# Patient Record
Sex: FEMALE | Race: WHITE | HISPANIC OR LATINO | Employment: OTHER | URBAN - METROPOLITAN AREA
[De-identification: names, ages, dates, MRNs, and addresses within clinical notes are randomized per-mention and may not be internally consistent; named-entity substitution may affect disease eponyms.]

---

## 2017-06-16 ENCOUNTER — OFFICE VISIT (OUTPATIENT)
Dept: MEDICAL GROUP | Facility: MEDICAL CENTER | Age: 42
End: 2017-06-16
Payer: OTHER GOVERNMENT

## 2017-06-16 VITALS
OXYGEN SATURATION: 94 % | SYSTOLIC BLOOD PRESSURE: 112 MMHG | HEIGHT: 59 IN | BODY MASS INDEX: 26.81 KG/M2 | TEMPERATURE: 98.2 F | WEIGHT: 133 LBS | DIASTOLIC BLOOD PRESSURE: 72 MMHG | HEART RATE: 64 BPM

## 2017-06-16 DIAGNOSIS — R42 DIZZINESS: ICD-10-CM

## 2017-06-16 DIAGNOSIS — R06.02 SOB (SHORTNESS OF BREATH): ICD-10-CM

## 2017-06-16 DIAGNOSIS — E55.9 VITAMIN D DEFICIENCY: ICD-10-CM

## 2017-06-16 DIAGNOSIS — Z13.29 SCREENING FOR THYROID DISORDER: ICD-10-CM

## 2017-06-16 DIAGNOSIS — R00.2 HEART PALPITATIONS: ICD-10-CM

## 2017-06-16 DIAGNOSIS — F41.9 ANXIETY: ICD-10-CM

## 2017-06-16 DIAGNOSIS — E78.2 MIXED HYPERLIPIDEMIA: ICD-10-CM

## 2017-06-16 DIAGNOSIS — R73.9 HYPERGLYCEMIA: ICD-10-CM

## 2017-06-16 DIAGNOSIS — R10.32 LLQ ABDOMINAL PAIN: ICD-10-CM

## 2017-06-16 PROCEDURE — 99215 OFFICE O/P EST HI 40 MIN: CPT | Performed by: FAMILY MEDICINE

## 2017-06-16 RX ORDER — ALPRAZOLAM 0.25 MG/1
0.25 TABLET ORAL 2 TIMES DAILY PRN
Qty: 60 TAB | Refills: 0 | Status: SHIPPED | OUTPATIENT
Start: 2017-06-16 | End: 2018-01-19 | Stop reason: SDUPTHER

## 2017-06-16 NOTE — MR AVS SNAPSHOT
"Dasha Luu   2017 10:20 AM   Office Visit   MRN: [id number]    Department:  [facility]   Dept Phone:  [phone]    Description:  Female : 1975   Provider:  Anni Munoz M.D.           Reason for Visit     LLQ Pain     Vertigo           Allergies as of 2017     Allergen Noted Reactions    Penicillins 2011   Itching    redness      You were diagnosed with     Dizziness   [482152]       SOB (shortness of breath)   [358537]       Heart palpitations   [707639]       LLQ abdominal pain   [752479]       Mixed hyperlipidemia   [272.2.ICD-9-CM]       Vitamin D deficiency   [5591871]       Hyperglycemia   [464493]       Screening for thyroid disorder   [V77.0.ICD-9-CM]       Anxiety   [144218]         Vital Signs     Blood Pressure Pulse Temperature Height Weight Body Mass Index    112/72 mmHg 64 36.8 °C (98.2 °F) 1.499 m (4' 11.02\") 60.328 kg (133 lb) 26.85 kg/m2    Oxygen Saturation Last Menstrual Period Breastfeeding? Smoking Status          94% 2017 No Former Smoker        Basic Information     Date Of Birth Sex Race Ethnicity Preferred Language    1975 Female  or   Origin (Chinese,Dominican,South African,Jeff, etc) English      Your appointments     2017  8:00 AM   US GYN PELVIS TRANSVAGINAL with [facility] US 1   [facility] IMAGING - ULTRASOUND - [facility]  [address]  [facility] [id number] [phone]            2017  7:20 AM   ANNUAL EXAM PREVENTATIVE with Anni Munoz M.D.   [facility])    [address]  [place] [id number] [phone]              Problem List              ICD-10-CM Priority Class Noted - Resolved    External hemorrhoids K64.4   2014 - Present    Anxiety F41.9   2014 - Present    Hyperglycemia R73.9   Unknown - Present    Thrombocytosis (CMS-HCC) D47.3   Unknown - " Present    History of tobacco abuse Z87.891   6/17/2016 - Present    Thyromegaly E01.0   6/17/2016 - Present    Screening for cervical cancer Z12.4   12/2/2016 - Present    Mixed hyperlipidemia E78.2   12/2/2016 - Present    Vitamin D deficiency E55.9   12/2/2016 - Present    Dizziness R42   6/16/2017 - Present    SOB (shortness of breath) R06.02   6/16/2017 - Present    Heart palpitations R00.2   6/16/2017 - Present    LLQ abdominal pain R10.32   6/16/2017 - Present      Health Maintenance        Date Due Completion Dates    PAP SMEAR 7/26/1998 ---    IMM DTaP/Tdap/Td Vaccine (4 - Td) 11/30/1998 11/30/1988, 10/27/1987, 8/24/1987            Current Immunizations     Dtap Vaccine 11/30/1988, 10/27/1987, 8/24/1987    IPV 11/30/1988, 10/27/1987, 8/24/1987      Below and/or attached are the medications your provider expects you to take. Review all of your home medications and newly ordered medications with your provider and/or pharmacist. Follow medication instructions as directed by your provider and/or pharmacist. Please keep your medication list with you and share with your provider. Update the information when medications are discontinued, doses are changed, or new medications (including over-the-counter products) are added; and carry medication information at all times in the event of emergency situations     Allergies:  PENICILLINS - Itching               Medications  Valid as of: June 16, 2017 -  1:34 PM    Generic Name Brand Name Tablet Size Instructions for use    ALPRAZolam (Tab) XANAX 0.25 MG Take 1 Tab by mouth 2 times a day as needed for Anxiety.        Ibuprofen (Tab) MOTRIN 200 MG Take 200 mg by mouth every 6 hours as needed.        .                 Medicines prescribed today were sent to:     [facility] [address] [place] [address] [place], [place] - [address] [place] [address] AT [facility] & [address] [address]    Phone: [phone] Fax: [phone]    Open 24 Hours?: No      Medication refill  instructions:       If your prescription bottle indicates you have medication refills left, it is not necessary to call your provider’s office. Please contact your pharmacy and they will refill your medication.    If your prescription bottle indicates you do not have any refills left, you may request refills at any time through one of the following ways: The online [employer] system (except Urgent Care), by calling your provider’s office, or by asking your pharmacy to contact your provider’s office with a refill request. Medication refills are processed only during regular business hours and may not be available until the next business day. Your provider may request additional information or to have a follow-up visit with you prior to refilling your medication.   *Please Note: Medication refills are assigned a new Rx number when refilled electronically. Your pharmacy may indicate that no refills were authorized even though a new prescription for the same medication is available at the pharmacy. Please request the medicine by name with the pharmacy before contacting your provider for a refill.        Your To Do List     Future Labs/Procedures Complete By Expires    CBC WITH DIFFERENTIAL  As directed 6/17/2018    COMP METABOLIC PANEL  As directed 6/17/2018    EKG  As directed 6/16/2018    FREE THYROXINE  As directed 6/17/2018    HEMOGLOBIN A1C  As directed 6/17/2018    LIPID PROFILE  As directed 6/17/2018    TSH  As directed 6/17/2018    US-GYN-PELVIS TRANSVAGINAL  As directed 12/17/2017    VITAMIN D,25 HYDROXY  As directed 6/17/2018         Trevena Access Code: Activation code not generated  Current Trevena Status: Active

## 2017-06-16 NOTE — ASSESSMENT & PLAN NOTE
Started a few weeks ago.  Now having episodes where she feels like she is taking deep breaths that last 5-10 minutes.  She has a history of panic attacks. This feels different than those. She denies any depression.

## 2017-06-22 NOTE — ASSESSMENT & PLAN NOTE
Patient has been trying to watch her diet and exercise to control her hyperlipidemia and hyperglycemia. She is due for labs.

## 2017-06-22 NOTE — ASSESSMENT & PLAN NOTE
Patient feels as if her heart starts racing and then skips a beat when she is anxious. This seems to correspond to her feeling of lightheadedness. It doesn't seem to be related to stress or lack of sleep. She has been limiting her caffeine and that doesn't seem to make a difference. She has stopped smoking.

## 2017-06-22 NOTE — PROGRESS NOTES
Subjective:     Chief Complaint   Patient presents with   • LLQ Pain   • Vertigo       Dasha Luu is a 41 y.o. female here today for evaluation and management of:    Dizziness  Started a few weeks ago.  Now having episodes where she feels like she is taking deep breaths that last 5-10 minutes.  She has a history of panic attacks. This feels different than those. She denies any depression.    Anxiety  Patient complains of persistent anxiety.  She often gets very stressed at work or at home. She is using the alprazolam occasionally when he gets very bad. She denies any suicidal thoughts or plans.    Heart palpitations  Patient feels as if her heart starts racing and then skips a beat when she is anxious. This seems to correspond to her feeling of lightheadedness. It doesn't seem to be related to stress or lack of sleep. She has been limiting her caffeine and that doesn't seem to make a difference. She has stopped smoking.    LLQ abdominal pain  Patient is also complaining of occasional sharp pain in the left lower quadrant towards the adnexa. She is unsure if it happens midcycle or not. She does not believe she is ever had an ovarian cyst. She denies any change in her menstrual cycles.    Mixed hyperlipidemia  Patient has been trying to watch her diet and exercise to control her hyperlipidemia and hyperglycemia. She is due for labs.    Vitamin D deficiency  Patient has not been taking her vitamin D supplementation. She denies any recent fractures.       Allergies   Allergen Reactions   • Penicillins Itching     redness       Current medicines (including changes today)  Current Outpatient Prescriptions   Medication Sig Dispense Refill   • alprazolam (XANAX) 0.25 MG Tab Take 1 Tab by mouth 2 times a day as needed for Anxiety. 60 Tab 0   • ibuprofen (MOTRIN) 200 MG Tab Take 200 mg by mouth every 6 hours as needed.       No current facility-administered medications for this visit.       She  has a  "past medical history of Anxiety; Hyperglycemia; and Thrombocytosis (CMS-HCC).    Patient Active Problem List    Diagnosis Date Noted   • Dizziness 06/16/2017   • SOB (shortness of breath) 06/16/2017   • Heart palpitations 06/16/2017   • LLQ abdominal pain 06/16/2017   • Screening for cervical cancer 12/02/2016   • Mixed hyperlipidemia 12/02/2016   • Vitamin D deficiency 12/02/2016   • History of tobacco abuse 06/17/2016   • Thyromegaly 06/17/2016   • Hyperglycemia    • Thrombocytosis (CMS-HCC)    • External hemorrhoids 06/20/2014   • Anxiety 06/20/2014       ROS   No fever or chills.  No nausea or vomiting.  No cough.  No pain with urination or hematuria.  No black or bloody stools.       Objective:     Blood pressure 112/72, pulse 64, temperature 36.8 °C (98.2 °F), height 1.499 m (4' 11.02\"), weight 60.328 kg (133 lb), last menstrual period 06/12/2017, SpO2 94 %, not currently breastfeeding. Body mass index is 26.85 kg/(m^2).   Physical Exam:  Well developed, well nourished.  Alert, oriented in no acute distress.  Eye contact is good, speech goal directed, affect calm  Eyes: conjunctiva non-injected, sclera non-icteric. PERRL. EOMs intact  Ears: Pinna normal. TM pearly gray.   Nose: Nares are patent.  Normal mucosa  Mouth: Oral mucous membranes pink and moist with no lesions. No erythema  Neck Supple.  No adenopathy or masses in the neck or supraclavicular regions. No thyromegaly  Lungs: clear to auscultation bilaterally with good excursion. No wheezes or rhonchi  CV: regular rate and rhythm. No murmur. No carotid bruits   Abdomen: soft, tender to palpation of left lower quadrant, no masses or organomegaly.  No rebound or guarding  Ext: no edema, color normal, vascularity normal, temperature normal  ABD: Abdomen soft, ND, NT. No CVAT. No suprapubic tenderness.  PELVIC:Perineum and external genitalia normal without rash. Vagina with normal and physiologic discharge. Cervix without visible lesions or discharge. " Bimanual exam without adnexal masses or cervical motion tenderness but there is tenderness to palpation of the left adnexa  Neurological: Alert and oriented x 3. DTRs 2+ and symmetric. No cranial nerve deficit. Strength and sensation intact. Negative Rhomberg. No dysdiadochokinesia.   Cranial nerves II through XII are grossly intact.  EKG shows sinus rhythm with a rate of 58. No ST elevations or depressions. Normal axis.          Assessment and Plan:   The following treatment plan was discussed    1. Dizziness  Check for anemia and hyperglycemia. Await results  - CBC WITH DIFFERENTIAL; Future  - COMP METABOLIC PANEL; Future  - EKG; Future    2. SOB (shortness of breath)  Normal EKG. Reassured.  - EKG; Future    3. Heart palpitations  Normal EKG. Patient reassured.  - EKG; Future    4. LLQ abdominal pain  Ultrasound to assess for ovarian cyst  - CBC WITH DIFFERENTIAL; Future  - US-GYN-PELVIS TRANSVAGINAL; Future    5. Mixed hyperlipidemia  Recheck lipids. Discussed treatment options after labs are back  - LIPID PROFILE; Future    6. Vitamin D deficiency  Recheck vitamin D. Adjust supplementation as needed  - VITAMIN D,25 HYDROXY; Future    7. Hyperglycemia  Dietary counseling done. Recheck labs  - COMP METABOLIC PANEL; Future  - HEMOGLOBIN A1C; Future    8. Screening for thyroid disorder  Screening labs ordered.  Await results for counseling.    - TSH; Future  - FREE THYROXINE; Future    9. Anxiety  Refill alprazolam for occasional use. Controlled substance agreement was signed. An [place]  was reviewed.  - CONTROLLED SUBSTANCE TREATMENT AGREEMENT  - alprazolam (XANAX) 0.25 MG Tab; Take 1 Tab by mouth 2 times a day as needed for Anxiety.  Dispense: 60 Tab; Refill: 0    Any change or worsening of signs or symptoms, patient encouraged to follow-up or report to the emergency room for further evaluation. Patient understands and agrees.    Followup: Return in about 3 months (around 9/16/2017).

## 2017-06-22 NOTE — ASSESSMENT & PLAN NOTE
Patient is also complaining of occasional sharp pain in the left lower quadrant towards the adnexa. She is unsure if it happens midcycle or not. She does not believe she is ever had an ovarian cyst. She denies any change in her menstrual cycles.

## 2017-06-22 NOTE — ASSESSMENT & PLAN NOTE
Patient complains of persistent anxiety.  She often gets very stressed at work or at home. She is using the alprazolam occasionally when he gets very bad. She denies any suicidal thoughts or plans.

## 2017-07-01 ENCOUNTER — HOSPITAL ENCOUNTER (OUTPATIENT)
Dept: RADIOLOGY | Facility: MEDICAL CENTER | Age: 42
End: 2017-07-01
Attending: FAMILY MEDICINE
Payer: OTHER GOVERNMENT

## 2017-07-01 DIAGNOSIS — R10.32 LLQ ABDOMINAL PAIN: ICD-10-CM

## 2017-07-01 PROCEDURE — 76830 TRANSVAGINAL US NON-OB: CPT

## 2017-07-07 ENCOUNTER — TELEPHONE (OUTPATIENT)
Dept: MEDICAL GROUP | Facility: MEDICAL CENTER | Age: 42
End: 2017-07-07

## 2017-07-18 ENCOUNTER — OFFICE VISIT (OUTPATIENT)
Dept: MEDICAL GROUP | Facility: MEDICAL CENTER | Age: 42
End: 2017-07-18
Payer: OTHER GOVERNMENT

## 2017-07-18 ENCOUNTER — HOSPITAL ENCOUNTER (OUTPATIENT)
Facility: MEDICAL CENTER | Age: 42
End: 2017-07-18
Attending: FAMILY MEDICINE
Payer: OTHER GOVERNMENT

## 2017-07-18 VITALS
BODY MASS INDEX: 27.42 KG/M2 | HEIGHT: 59 IN | WEIGHT: 136 LBS | HEART RATE: 71 BPM | TEMPERATURE: 98.1 F | OXYGEN SATURATION: 98 % | DIASTOLIC BLOOD PRESSURE: 70 MMHG | SYSTOLIC BLOOD PRESSURE: 110 MMHG

## 2017-07-18 DIAGNOSIS — Z12.4 SCREENING FOR CERVICAL CANCER: ICD-10-CM

## 2017-07-18 DIAGNOSIS — Z01.419 WELL WOMAN EXAM WITH ROUTINE GYNECOLOGICAL EXAM: ICD-10-CM

## 2017-07-18 DIAGNOSIS — Z23 NEED FOR VACCINATION: ICD-10-CM

## 2017-07-18 PROCEDURE — 90715 TDAP VACCINE 7 YRS/> IM: CPT | Performed by: FAMILY MEDICINE

## 2017-07-18 PROCEDURE — 87624 HPV HI-RISK TYP POOLED RSLT: CPT

## 2017-07-18 PROCEDURE — 88175 CYTOPATH C/V AUTO FLUID REDO: CPT

## 2017-07-18 PROCEDURE — 99395 PREV VISIT EST AGE 18-39: CPT | Mod: 25 | Performed by: FAMILY MEDICINE

## 2017-07-18 PROCEDURE — 90471 IMMUNIZATION ADMIN: CPT | Performed by: FAMILY MEDICINE

## 2017-07-18 NOTE — MR AVS SNAPSHOT
"Dasha Luu   2017 7:20 AM   Office Visit   MRN: [id number]    Department:  [facility]   Dept Phone:  [phone]    Description:  Female : 1975   Provider:  Anni Munoz M.D.           Reason for Visit     Gynecologic Exam           Allergies as of 2017     Allergen Noted Reactions    Penicillins 2011   Itching    redness      You were diagnosed with     Well woman exam with routine gynecological exam   [076988]       Screening for cervical cancer   [638270]       Need for vaccination   [946032]         Vital Signs     Blood Pressure Pulse Temperature Height Weight Body Mass Index    110/70 mmHg 71 36.7 °C (98.1 °F) 1.499 m (4' 11.02\") 61.689 kg (136 lb) 27.45 kg/m2    Oxygen Saturation Last Menstrual Period Breastfeeding? Smoking Status          98% 07/10/2017 No Former Smoker        Basic Information     Date Of Birth Sex Race Ethnicity Preferred Language    1975 Female  or   Origin (Sami,Wallisian,Citizen of [place],Jeff, etc) English      Problem List              ICD-10-CM Priority Class Noted - Resolved    External hemorrhoids K64.4   2014 - Present    Anxiety F41.9   2014 - Present    Hyperglycemia R73.9   Unknown - Present    Thrombocytosis (CMS-HCC) D47.3   Unknown - Present    History of tobacco abuse Z87.891   2016 - Present    Thyromegaly E01.0   2016 - Present    Screening for cervical cancer Z12.4   2016 - Present    Mixed hyperlipidemia E78.2   2016 - Present    Vitamin D deficiency E55.9   2016 - Present    Dizziness R42   2017 - Present    SOB (shortness of breath) R06.02   2017 - Present    Heart palpitations R00.2   2017 - Present    LLQ abdominal pain R10.32   2017 - Present    Well woman exam with routine gynecological exam Z01.419   2017 - Present      Health Maintenance        Date Due Completion Dates    PAP SMEAR 1998 ---    IMM INFLUENZA " (1) 9/1/2017 ---    IMM DTaP/Tdap/Td Vaccine (5 - Td) 7/18/2027 7/18/2017, 11/30/1988, 10/27/1987, 8/24/1987            Current Immunizations     Dtap Vaccine 11/30/1988, 10/27/1987, 8/24/1987    IPV 11/30/1988, 10/27/1987, 8/24/1987    Tdap Vaccine 7/18/2017      Below and/or attached are the medications your provider expects you to take. Review all of your home medications and newly ordered medications with your provider and/or pharmacist. Follow medication instructions as directed by your provider and/or pharmacist. Please keep your medication list with you and share with your provider. Update the information when medications are discontinued, doses are changed, or new medications (including over-the-counter products) are added; and carry medication information at all times in the event of emergency situations     Allergies:  PENICILLINS - Itching               Medications  Valid as of: July 18, 2017 -  9:11 AM    Generic Name Brand Name Tablet Size Instructions for use    ALPRAZolam (Tab) XANAX 0.25 MG Take 1 Tab by mouth 2 times a day as needed for Anxiety.        Ibuprofen (Tab) MOTRIN 200 MG Take 200 mg by mouth every 6 hours as needed.        .                 Medicines prescribed today were sent to:     [facility] [address] [place] [address] & [address] [address]    Phone: [phone] Fax: [phone]    Open 24 Hours?: No      Medication refill instructions:       If your prescription bottle indicates you have medication refills left, it is not necessary to call your provider’s office. Please contact your pharmacy and they will refill your medication.    If your prescription bottle indicates you do not have any refills left, you may request refills at any time through one of the following ways: The online [employer] system (except Urgent Care), by calling your provider’s office, or by asking your pharmacy to contact your provider’s office  with a refill request. Medication refills are processed only during regular business hours and may not be available until the next business day. Your provider may request additional information or to have a follow-up visit with you prior to refilling your medication.   *Please Note: Medication refills are assigned a new Rx number when refilled electronically. Your pharmacy may indicate that no refills were authorized even though a new prescription for the same medication is available at the pharmacy. Please request the medicine by name with the pharmacy before contacting your provider for a refill.        Your To Do List     Future Labs/Procedures Complete By Expires    THINPREP PAP WITH HPV  As directed 7/19/2018         [employer] Access Code: Activation code not generated  Current [employer] Status: Active

## 2017-07-18 NOTE — PROGRESS NOTES
SUBJECTIVE:   Chief Complaint   Patient presents with   • Gynecologic Exam       41 y.o. female for annual routine gynecologic exam    Obstetric History       T0      TAB0   SAB0   E0   M0   L1       History   Sexual Activity   • Sexual Activity:   • Partners: Male   • Birth Control/ Protection: Surgical     LMP 7/10/17  Last Pap: 5 years ago  HPV testing unknown  H/O Abnormal Pap unknown  Menses every month with  28 days heavy bleeding.  Cramping is moderate.   She does take OTC analgesics for cramps.  No significant bloating/fluid retention, pelvic pain, or dyspareunia. No vaginal discharge   She does not perform regular self breast exams.  No breast tenderness, mass, nipple discharge, changes in size or contour, or abnormal cyclic discomfort.        ROS:    No urinary tract symptoms, no incontinence.   No abdominal pain, change in bowel habits, black or bloody stools.    No unusual headaches, no visual changes, menstrual migraines   No prolonged cough. No dyspnea or chest pain on exertion.  No depression, labile mood, anxiety ,libido changes, insomnia.  No new/concerning skin lesions,    Exercise: no regular exercise program    Social History   Substance Use Topics   • Smoking status: Former Smoker -- 0.25 packs/day for 16 years     Types: Cigarettes   • Smokeless tobacco: Never Used   • Alcohol Use: No      Comment: 1-2 per week. non none       Patient Active Problem List    Diagnosis Date Noted   • Well woman exam with routine gynecological exam 2017   • Dizziness 2017   • SOB (shortness of breath) 2017   • Heart palpitations 2017   • LLQ abdominal pain 2017   • Screening for cervical cancer 2016   • Mixed hyperlipidemia 2016   • Vitamin D deficiency 2016   • History of tobacco abuse 2016   • Thyromegaly 2016   • Hyperglycemia    • Thrombocytosis (CMS-HCC)    • External hemorrhoids 2014   • Anxiety 2014       Past Medical  "History   Diagnosis Date   • Anxiety    • Hyperglycemia    • Thrombocytosis (CMS-HCC)      Past Surgical History   Procedure Laterality Date   • Abdominoplasty  6/15/2011     Performed by ESVIN BAILEY at [facility]ERY [facility]   • Liposuction  6/15/2011     Performed by ESVIN BAILEY at [facility]   • Mammoplasty augmentation  6/15/2011     Performed by ESVIN BAILEY at [facility]         Family History   Problem Relation Age of Onset   • Cancer Mother      tomor on spinal cord   • Diabetes Mother    • Hypertension Mother    • Alcohol/Drug Father    • Diabetes Sister      gestational dm   • Cancer Maternal Grandmother      breast   • Diabetes Maternal Grandmother    • Heart Disease Maternal Grandmother      Family Status   Relation Status Death Age   • Mother Alive    • Father Alive    • Sister Alive    • Maternal Grandmother       dialysis, breast ca   • Maternal Grandfather     • Paternal Grandmother Alive    • Paternal Grandfather Alive          Current medicines (including changes today)  Current Outpatient Prescriptions   Medication Sig Dispense Refill   • alprazolam (XANAX) 0.25 MG Tab Take 1 Tab by mouth 2 times a day as needed for Anxiety. 60 Tab 0   • ibuprofen (MOTRIN) 200 MG Tab Take 200 mg by mouth every 6 hours as needed.       No current facility-administered medications for this visit.       LMP Date: 07/10/17   Allergies: Penicillins     Preventive Care:  Health Maintenance Topics with due status: Overdue       Topic Date Due    PAP SMEAR 1998    IMM DTaP/Tdap/Td Vaccine 1998       OBJECTIVE:   /70 mmHg  Pulse 71  Temp(Src) 36.7 °C (98.1 °F)  Ht 1.499 m (4' 11.02\")  Wt 61.689 kg (136 lb)  BMI 27.45 kg/m2  SpO2 98%  LMP 07/10/2017  Breastfeeding? No  Body mass index is 27.45 kg/(m^2).      Gen: Well developed, well nourished in no acute distress.   Skin: Pink, warm, and dry. No rashes  Eyes: conjunctiva " non-injected, sclera non-icteric. EOMs intact.   Nasal mucosa without edema nor erythema. No facial tenderness  Ears:Pinna normal. TM pearly gray.   Nose: Nares patent.  No discharge.  Oral mucous membranes pink and moist with no lesions.  Neck: Supple, trachea midline. No adenopathy or masses in the neck or supraclavicular regions. No thyromegaly.  Lungs: Effort is normal. Clear to auscultation bilaterally with good excursion.  CV: regular rate and rhythm. No murmur.  Abdomen: soft, nontender, + BS. No HSM.  No CVAT  Ext: no edema, color normal, vascularity normal, temperature normal  Alert and oriented Eye contact is good, speech goal directed, affect calm     Breast Exam: Performed with instruction during examination. No axillary lymphadenopathy, no skin changes, no dominant masses other than implants in place. No nipple retraction  Pelvic Exam:  Normal external genitalia with no lesions. Normal vaginal mucosa with normal rugation and no discharge. Cervix with multiple cystic structures. No cervical motion tenderness. Uterus is normal sized with no masses. No adnexal tenderness or enlargement appreciated. Pap is obtained and the specimen was sent to lab  Rectal: Good tone, heme negative. No masses.  <ASSESSMENT and PLAN>  1. Well woman exam with routine gynecological exam     2. Screening for cervical cancer  THINPREP PAP WITH HPV   3. Need for vaccination  TDAP VACCINE =>8YO IM       Discussed  mammography screening, exercise, breast exams and osteoporosis prevention     Follow-up in 1 years for next Gyn exam and 3 years for next Pap.   Return in about 1 year (around 7/18/2018).

## 2017-07-19 LAB
CYTOLOGY REG CYTOL: NORMAL
HPV HR 12 DNA CVX QL NAA+PROBE: NEGATIVE
HPV16 DNA SPEC QL NAA+PROBE: NEGATIVE
HPV18 DNA SPEC QL NAA+PROBE: NEGATIVE
SPECIMEN SOURCE: NORMAL

## 2017-08-25 ENCOUNTER — OFFICE VISIT (OUTPATIENT)
Dept: MEDICAL GROUP | Facility: MEDICAL CENTER | Age: 42
End: 2017-08-25
Payer: OTHER GOVERNMENT

## 2017-08-25 VITALS
WEIGHT: 135 LBS | OXYGEN SATURATION: 98 % | DIASTOLIC BLOOD PRESSURE: 76 MMHG | BODY MASS INDEX: 26.5 KG/M2 | SYSTOLIC BLOOD PRESSURE: 112 MMHG | TEMPERATURE: 98.4 F | HEART RATE: 65 BPM | HEIGHT: 60 IN

## 2017-08-25 DIAGNOSIS — F41.9 ANXIETY: ICD-10-CM

## 2017-08-25 DIAGNOSIS — R73.9 HYPERGLYCEMIA: ICD-10-CM

## 2017-08-25 DIAGNOSIS — M54.5 LOW BACK PAIN, UNSPECIFIED BACK PAIN LATERALITY, UNSPECIFIED CHRONICITY, WITH SCIATICA PRESENCE UNSPECIFIED: ICD-10-CM

## 2017-08-25 DIAGNOSIS — E55.9 VITAMIN D DEFICIENCY: ICD-10-CM

## 2017-08-25 PROCEDURE — 99214 OFFICE O/P EST MOD 30 MIN: CPT | Performed by: INTERNAL MEDICINE

## 2017-08-25 NOTE — MR AVS SNAPSHOT
"Dasha Luu   2017 7:40 AM   Office Visit   MRN: [id number]    Department:  [facility]   Dept Phone:  [phone]    Description:  Female : 1975   Provider:  Stefan Cavanaugh M.D.           Allergies as of 2017     Allergen Noted Reactions    Penicillins 2011   Itching    redness      You were diagnosed with     Anxiety   [818764]       Hyperglycemia   [480674]       Vitamin D deficiency   [5839871]       Low back pain, unspecified back pain laterality, unspecified chronicity, with sciatica presence unspecified   [[id number]]         Vital Signs     Blood Pressure Pulse Temperature Height Weight Body Mass Index    112/76 mmHg 65 36.9 °C (98.4 °F) 1.511 m (4' 11.5\") 61.236 kg (135 lb) 26.82 kg/m2    Oxygen Saturation Smoking Status                98% Former Smoker          Basic Information     Date Of Birth Sex Race Ethnicity Preferred Language    1975 Female  or   Origin (French,Jamaican,French,Jeff, etc) English      Problem List              ICD-10-CM Priority Class Noted - Resolved    External hemorrhoids K64.4   2014 - Present    Anxiety F41.9   2014 - Present    Hyperglycemia R73.9   Unknown - Present    Thrombocytosis (CMS-HCC) D47.3   Unknown - Present    History of tobacco abuse Z87.891   2016 - Present    Thyromegaly E01.0   2016 - Present    Screening for cervical cancer Z12.4   2016 - Present    Mixed hyperlipidemia E78.2   2016 - Present    Vitamin D deficiency E55.9   2016 - Present    Dizziness R42   2017 - Present    SOB (shortness of breath) R06.02   2017 - Present    Heart palpitations R00.2   2017 - Present    LLQ abdominal pain R10.32   2017 - Present    Well woman exam with routine gynecological exam Z01.419   2017 - Present      Health Maintenance        Date Due Completion Dates    MAMMOGRAM 2017 ---    IMM INFLUENZA (1) 2017 ---   " PAP SMEAR 7/18/2020 7/18/2017    IMM DTaP/Tdap/Td Vaccine (5 - Td) 7/18/2027 7/18/2017, 11/30/1988, 10/27/1987, 8/24/1987            Current Immunizations     Dtap Vaccine 11/30/1988, 10/27/1987, 8/24/1987    IPV 11/30/1988, 10/27/1987, 8/24/1987    Tdap Vaccine 7/18/2017      Below and/or attached are the medications your provider expects you to take. Review all of your home medications and newly ordered medications with your provider and/or pharmacist. Follow medication instructions as directed by your provider and/or pharmacist. Please keep your medication list with you and share with your provider. Update the information when medications are discontinued, doses are changed, or new medications (including over-the-counter products) are added; and carry medication information at all times in the event of emergency situations     Allergies:  PENICILLINS - Itching               Medications  Valid as of: August 25, 2017 -  8:58 AM    Generic Name Brand Name Tablet Size Instructions for use    ALPRAZolam (Tab) XANAX 0.25 MG Take 1 Tab by mouth 2 times a day as needed for Anxiety.        Ibuprofen (Tab) MOTRIN 200 MG Take 200 mg by mouth every 6 hours as needed.        .                 Medicines prescribed today were sent to:     [facility] [address] [place] [address] & [address] [address]    Phone: [phone] Fax: [phone]    Open 24 Hours?: No      Medication refill instructions:       If your prescription bottle indicates you have medication refills left, it is not necessary to call your provider’s office. Please contact your pharmacy and they will refill your medication.    If your prescription bottle indicates you do not have any refills left, you may request refills at any time through one of the following ways: The online everyArt system (except Urgent Care), by calling your provider’s office, or by asking your pharmacy to contact your  provider’s office with a refill request. Medication refills are processed only during regular business hours and may not be available until the next business day. Your provider may request additional information or to have a follow-up visit with you prior to refilling your medication.   *Please Note: Medication refills are assigned a new Rx number when refilled electronically. Your pharmacy may indicate that no refills were authorized even though a new prescription for the same medication is available at the pharmacy. Please request the medicine by name with the pharmacy before contacting your provider for a refill.           [employer] Access Code: Activation code not generated  Current [employer] Status: Active

## 2017-08-25 NOTE — PROGRESS NOTES
Subjective:      Dasha Luu is a 42 y.o. 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  She has been involved in patient care previously. No difficulties with carrying and lifting.  Did have history of back injury years ago but has seen a chiropractor previously, no current active issues with lifting or carrying things.  The back pain tends to be localized when this occurs, no radiation, no sensory changes lower extremity, no weakness of her legs.  Not taking any regular anti-inflammatories or pain medications.  Patient has been healthy otherwise and takes no regular medications, has had anxiety attacks and has been on xanax as needed basis, anxiety has been stable.  No recent flareups of anxiety.  No depression.  Good social support with family and   No history of TB, no history of infectious disease, no recurrent infections, no cough, shortness of breath, fevers, chills, hemoptysis  Has had negative PPD testing in the past, no BCG  No history infectious or communicable disease  Family history of diabetes mother side patient has had elevated fasting blood sugar, has not had time to exercise because of work obligations  Patient has had elevated fasting blood sugar but no diabetes diagnosis, no hypertension, no dyslipidemia  No history of cardiac disease, no history of asthma  No chest pain, shortness of breath with activity  No tobacco, no etoh  Medications, allergies, medical history, surgical history, social history, family history reviewed and updated  Patient is , 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 60 Tab 0   • ibuprofen (MOTRIN) 200 MG Tab Take 200 mg by mouth every 6 hours as needed.       No current facility-administered medications for this visit.     Patient Active Problem List    Diagnosis Date Noted   • Well woman exam with routine gynecological exam 07/18/2017   • Dizziness 06/16/2017   • SOB (shortness of breath) 06/16/2017   • Heart palpitations 06/16/2017   • LLQ abdominal pain 06/16/2017   • Screening for cervical cancer 12/02/2016   • Mixed hyperlipidemia 12/02/2016   • Vitamin D deficiency 12/02/2016   • History of tobacco abuse 06/17/2016   • Thyromegaly 06/17/2016   • Hyperglycemia    • Thrombocytosis (CMS-HCC)    • External hemorrhoids 06/20/2014   • Anxiety 06/20/2014         ROS       Objective:     /76 mmHg  Pulse 65  Temp(Src) 36.9 °C (98.4 °F)  Ht 1.511 m (4' 11.5\")  Wt 61.236 kg (135 lb)  BMI 26.82 kg/m2  SpO2 98%     Physical Exam   Constitutional: She appears well-developed and well-nourished. No distress.   HENT:   Head: Normocephalic and atraumatic.   Right Ear: External ear normal.   Left Ear: External ear normal.   Mouth/Throat: Oropharynx is clear and moist.   Eyes: Conjunctivae are normal. Right eye exhibits no discharge. Left eye exhibits no discharge. No scleral icterus.   Neck: Neck supple. No JVD present. No thyromegaly present.   Cardiovascular: Normal rate, regular rhythm and normal heart sounds.    No murmur heard.  Pulmonary/Chest: Effort normal and breath sounds normal. No respiratory distress. She has no wheezes.   Abdominal: Soft. She exhibits no distension.   Musculoskeletal: She exhibits no edema.   Neurological: She is alert.   Skin: She is not diaphoretic. No erythema.   Psychiatric: She has a normal mood and affect. Her behavior is normal.   Nursing note and vitals reviewed.    No spinal tenderness, no " CVA tenderness  Lower extremities strength dorsiflexion, plantar flexion, knee flexion extension, hip flexion extension bilateral 5/5  No lower extremity edema  Normal back flexion and extension  Normal gait  Normal affect, insight, judgment     No hallucinations or delusions     Assessment/Plan:     Assessment  #1 evaluation for ability to work in care home environment,has no chronic debilitating underlying medical disease process, and is free of communicable disease with no history of communicable disease such as tuberculosis    #2 impaired fasting blood sugar with family history of diabetes, A1c June 23 of last year 5.9%    #3 history of low back pain sometimes worse with prolonged sitting doing nails at her salon currently stable and controlled without medication    #4 history of panic attacks on alprazolam as needed currently stable no depression currently stable and controlled    #5 low vitamin D labs done in June of last year 28 not taking supplementation      Plan  #1 letter provided indicating that I have evaluated her, she has no underlying chronic medical disease processes that would preclude her from caring for patients, and has no history or evidence of communicable disease at this time, should she require a form to be completed she will drop that off, should a PPD test be required, she can return for that to be administered    #2 patient's primary care provider Dr. Munoz ordered labs for her a few months ago, she has not gotten those done, repeat lab slip order was printed for her to get the laboratory studies done fasting    #3 importance of nutrition, exercise discussed given family history of diabetes and personal history of impaired fasting blood sugar    #4 recommend starting exercise 3 times per week minimum, limit sweets, candies, carbohydrate portion size, no sodas    #5 recommend stretching exercises, chiropractor visits as needed for low back    #6 continue Xanax as needed    #7 annual  influenza vaccine when available    #8 continue no tobacco    #9 follow-up PCP     #10 spent approximately 30 minutes with the patient, greater than 50% of the visit spent discussing lifestyle modification, nutrition, exercise, stretching, relaxation therapy, continue no tobacco, proper lifting for not injuring her back , composing letter and physician's statement indicating fitness for caring for patients    #11 vitamin D 2000 units daily

## 2017-08-25 NOTE — Clinical Note
August 25, 2017        Dear Sir or Madam,      This physician's statement is in regards to Ms. Dasha Luu (1975).  I had the opportunity to evaluate Ms. Luu  today, and find her to be in good overall health.  She does not have any medical conditions or diseases that would preclude her from caring for patients, and she has no communicable diseases.    From my perspective she is medically cleared to care for patients.  Should you have any questions or concerns about this medical evaluation, please feel free to contact my office.            Sincerely,        Stefanwilla Cavanaugh M.D.    Electronically Signed

## 2018-01-09 LAB
25(OH)D3+25(OH)D2 SERPL-MCNC: 22.2 NG/ML (ref 30–100)
ALBUMIN SERPL-MCNC: 4.4 G/DL (ref 3.5–5.5)
ALBUMIN/GLOB SERPL: 1.6 {RATIO} (ref 1.2–2.2)
ALP SERPL-CCNC: 69 IU/L (ref 39–117)
ALT SERPL-CCNC: 17 IU/L (ref 0–32)
AST SERPL-CCNC: 15 IU/L (ref 0–40)
BASOPHILS # BLD AUTO: 0 X10E3/UL (ref 0–0.2)
BASOPHILS NFR BLD AUTO: 0 %
BILIRUB SERPL-MCNC: 0.4 MG/DL (ref 0–1.2)
BUN SERPL-MCNC: 14 MG/DL (ref 6–24)
BUN/CREAT SERPL: 23 (ref 9–23)
CALCIUM SERPL-MCNC: 9.5 MG/DL (ref 8.7–10.2)
CHLORIDE SERPL-SCNC: 102 MMOL/L (ref 96–106)
CHOLEST SERPL-MCNC: 205 MG/DL (ref 100–199)
CO2 SERPL-SCNC: 22 MMOL/L (ref 18–29)
COMMENT 011824: ABNORMAL
CREAT SERPL-MCNC: 0.61 MG/DL (ref 0.57–1)
EOSINOPHIL # BLD AUTO: 0.2 X10E3/UL (ref 0–0.4)
EOSINOPHIL NFR BLD AUTO: 2 %
ERYTHROCYTE [DISTWIDTH] IN BLOOD BY AUTOMATED COUNT: 12.8 % (ref 12.3–15.4)
GLOBULIN SER CALC-MCNC: 2.7 G/DL (ref 1.5–4.5)
GLUCOSE SERPL-MCNC: 98 MG/DL (ref 65–99)
HBA1C MFR BLD: 5.7 % (ref 4.8–5.6)
HCT VFR BLD AUTO: 43.2 % (ref 34–46.6)
HDLC SERPL-MCNC: 82 MG/DL
HGB BLD-MCNC: 14.3 G/DL (ref 11.1–15.9)
IF AFRICAN AMERICAN  100797: 131 ML/MIN/1.73
IF NON AFRICAN AMER 100791: 114 ML/MIN/1.73
IMM GRANULOCYTES # BLD: 0 X10E3/UL (ref 0–0.1)
IMM GRANULOCYTES NFR BLD: 0 %
IMMATURE CELLS  115398: ABNORMAL
LDLC SERPL CALC-MCNC: 111 MG/DL (ref 0–99)
LYMPHOCYTES # BLD AUTO: 2.1 X10E3/UL (ref 0.7–3.1)
LYMPHOCYTES NFR BLD AUTO: 30 %
MCH RBC QN AUTO: 29.1 PG (ref 26.6–33)
MCHC RBC AUTO-ENTMCNC: 33.1 G/DL (ref 31.5–35.7)
MCV RBC AUTO: 88 FL (ref 79–97)
MONOCYTES # BLD AUTO: 0.5 X10E3/UL (ref 0.1–0.9)
MONOCYTES NFR BLD AUTO: 8 %
MORPHOLOGY BLD-IMP: ABNORMAL
NEUTROPHILS # BLD AUTO: 4 X10E3/UL (ref 1.4–7)
NEUTROPHILS NFR BLD AUTO: 60 %
NRBC BLD AUTO-RTO: ABNORMAL %
PLATELET # BLD AUTO: 440 X10E3/UL (ref 150–379)
POTASSIUM SERPL-SCNC: 4.9 MMOL/L (ref 3.5–5.2)
PROT SERPL-MCNC: 7.1 G/DL (ref 6–8.5)
RBC # BLD AUTO: 4.91 X10E6/UL (ref 3.77–5.28)
SODIUM SERPL-SCNC: 141 MMOL/L (ref 134–144)
T4 FREE SERPL-MCNC: 1.21 NG/DL (ref 0.82–1.77)
TRIGL SERPL-MCNC: 60 MG/DL (ref 0–149)
TSH SERPL DL<=0.005 MIU/L-ACNC: 2.12 UIU/ML (ref 0.45–4.5)
VLDLC SERPL CALC-MCNC: 12 MG/DL (ref 5–40)
WBC # BLD AUTO: 6.8 X10E3/UL (ref 3.4–10.8)

## 2018-01-19 ENCOUNTER — OFFICE VISIT (OUTPATIENT)
Dept: MEDICAL GROUP | Facility: MEDICAL CENTER | Age: 43
End: 2018-01-19
Payer: OTHER GOVERNMENT

## 2018-01-19 VITALS
HEIGHT: 60 IN | SYSTOLIC BLOOD PRESSURE: 120 MMHG | DIASTOLIC BLOOD PRESSURE: 76 MMHG | TEMPERATURE: 97.9 F | OXYGEN SATURATION: 100 % | BODY MASS INDEX: 26.9 KG/M2 | WEIGHT: 137 LBS | HEART RATE: 74 BPM | RESPIRATION RATE: 16 BRPM

## 2018-01-19 DIAGNOSIS — E78.5 DYSLIPIDEMIA: ICD-10-CM

## 2018-01-19 DIAGNOSIS — F41.1 GAD (GENERALIZED ANXIETY DISORDER): ICD-10-CM

## 2018-01-19 DIAGNOSIS — R73.9 HYPERGLYCEMIA: ICD-10-CM

## 2018-01-19 DIAGNOSIS — F41.9 ANXIETY: ICD-10-CM

## 2018-01-19 DIAGNOSIS — Z23 NEED FOR VACCINATION: ICD-10-CM

## 2018-01-19 DIAGNOSIS — D75.839 THROMBOCYTOSIS: ICD-10-CM

## 2018-01-19 PROCEDURE — 90471 IMMUNIZATION ADMIN: CPT | Performed by: FAMILY MEDICINE

## 2018-01-19 PROCEDURE — 90686 IIV4 VACC NO PRSV 0.5 ML IM: CPT | Performed by: FAMILY MEDICINE

## 2018-01-19 PROCEDURE — 99214 OFFICE O/P EST MOD 30 MIN: CPT | Mod: 25 | Performed by: FAMILY MEDICINE

## 2018-01-19 RX ORDER — ALPRAZOLAM 0.25 MG/1
0.25 TABLET ORAL 2 TIMES DAILY PRN
Qty: 60 TAB | Refills: 0 | Status: SHIPPED | OUTPATIENT
Start: 2018-01-19 | End: 2018-03-20

## 2018-01-19 ASSESSMENT — PATIENT HEALTH QUESTIONNAIRE - PHQ9
CLINICAL INTERPRETATION OF PHQ2 SCORE: 3
5. POOR APPETITE OR OVEREATING: 3 - NEARLY EVERY DAY
SUM OF ALL RESPONSES TO PHQ QUESTIONS 1-9: 14

## 2018-01-26 NOTE — ASSESSMENT & PLAN NOTE
Patient has elevated lipid levels. She would like to avoid medications if possible. She would like information on low-fat diet.

## 2018-01-26 NOTE — ASSESSMENT & PLAN NOTE
Patient has persistent elevated platelet counts. She denies any history of blood clots. She is concerned about this. She stopped smoking over a year ago.

## 2018-01-26 NOTE — PROGRESS NOTES
Subjective:     Chief Complaint   Patient presents with   • Results     labs   • Orders Needed     mammo   • Follow-Up     throat pain       Dasha Luu is a 42 y.o. female here today for evaluation and management of:    TANVIR (generalized anxiety disorder)  Stable. Currently taking alprazolam as prescribed for occasional use   Denies side effects and is tolerating well.  Mood is improved with current medication and therapy.    Patient denies SI/HI.  Depression Screen (PHQ-2/PHQ-9) 12/2/2016 1/19/2018   PHQ-2 Total Score 1 3   PHQ-9 Total Score - 14           Hyperglycemia  Patient continues to have hyperglycemia. She has not been exercising regularly. She denies any polyuria or polyphagia.    Thrombocytosis  Patient has persistent elevated platelet counts. She denies any history of blood clots. She is concerned about this. She stopped smoking over a year ago.    Dyslipidemia  Patient has elevated lipid levels. She would like to avoid medications if possible. She would like information on low-fat diet.       Allergies   Allergen Reactions   • Penicillins Itching     redness       Current medicines (including changes today)  Current Outpatient Prescriptions   Medication Sig Dispense Refill   • alprazolam (XANAX) 0.25 MG Tab Take 1 Tab by mouth 2 times a day as needed for Anxiety for up to 60 days. 60 Tab 0   • ibuprofen (MOTRIN) 200 MG Tab Take 200 mg by mouth every 6 hours as needed.       No current facility-administered medications for this visit.        She  has a past medical history of Anxiety; Hyperglycemia; and Thrombocytosis (CMS-HCC).    Patient Active Problem List    Diagnosis Date Noted   • Well woman exam with routine gynecological exam 07/18/2017   • Dizziness 06/16/2017   • SOB (shortness of breath) 06/16/2017   • Heart palpitations 06/16/2017   • LLQ abdominal pain 06/16/2017   • Screening for cervical cancer 12/02/2016   • Dyslipidemia 12/02/2016   • Vitamin D deficiency  "12/02/2016   • History of tobacco abuse 06/17/2016   • Thyromegaly 06/17/2016   • Hyperglycemia    • Thrombocytosis (CMS-HCC)    • External hemorrhoids 06/20/2014   • TANVIR (generalized anxiety disorder) 06/20/2014       ROS   No fever or chills.  No nausea or vomiting.  No chest pain or palpitations.  No cough or SOB.  No pain with urination or hematuria.  No black or bloody stools.       Objective:     Blood pressure 120/76, pulse 74, temperature 36.6 °C (97.9 °F), resp. rate 16, height 1.511 m (4' 11.5\"), weight 62.1 kg (137 lb), last menstrual period 01/01/2018, SpO2 100 %. Body mass index is 27.21 kg/m².   Physical Exam:  Well developed, well nourished.  Alert, oriented in no acute distress.  Eye contact is good, speech goal directed, affect calm  Eyes: conjunctiva non-injected, sclera non-icteric.  Ears: Pinna normal. TM pearly gray.   Nose: Nares are patent.  Normal mucosa  Mouth: Oral mucous membranes pink and moist with no lesions  Neck Supple.  No adenopathy or masses in the neck or supraclavicular regions. No thyromegaly  Lungs: clear to auscultation bilaterally with good excursion. No wheezes or rhonchi  CV: regular rate and rhythm. No murmur  Abdomen: soft, nontender, no masses or organomegaly.  No rebound or guarding. No splenomegaly  Ext: no edema, color normal, vascularity normal, temperature normal          Assessment and Plan:   The following treatment plan was discussed    1. Thrombocytosis (CMS-HCC)  Refer to hematology for further evaluation and treatment.  - REFERRAL TO HEMATOLOGY ONCOLOGY Referral to? [facility]    2. Hyperglycemia  Dietary counseling done. Prediabetes information given    3. Dyslipidemia  Low-fat diet information given. Encourage increase exercise    4. TANVIR (generalized anxiety disorder)  Refill alprazolam for occasional use  - alprazolam (XANAX) 0.25 MG Tab; Take 1 Tab by mouth 2 times a day as needed for Anxiety for up to 60 days.  Dispense: 60 Tab; Refill: 0  Controlled " substance agreement on chart    5. Need for vaccination    - INFLUENZA VACCINE QUAD INJ >3Y(PF)    Any change or worsening of signs or symptoms, patient encouraged to follow-up or report to the emergency room for further evaluation. Patient understands and agrees.    Followup: Return in about 6 months (around 7/19/2018).

## 2018-01-26 NOTE — ASSESSMENT & PLAN NOTE
Stable. Currently taking alprazolam as prescribed for occasional use   Denies side effects and is tolerating well.  Mood is improved with current medication and therapy.    Patient denies SI/HI.  Depression Screen (PHQ-2/PHQ-9) 12/2/2016 1/19/2018   PHQ-2 Total Score 1 3   PHQ-9 Total Score - 14

## 2018-01-26 NOTE — ASSESSMENT & PLAN NOTE
Patient continues to have hyperglycemia. She has not been exercising regularly. She denies any polyuria or polyphagia.

## 2018-01-29 ENCOUNTER — OFFICE VISIT (OUTPATIENT)
Dept: HEMATOLOGY ONCOLOGY | Facility: MEDICAL CENTER | Age: 43
End: 2018-01-29
Payer: OTHER GOVERNMENT

## 2018-01-29 ENCOUNTER — HOSPITAL ENCOUNTER (OUTPATIENT)
Facility: MEDICAL CENTER | Age: 43
End: 2018-01-29
Attending: INTERNAL MEDICINE
Payer: OTHER GOVERNMENT

## 2018-01-29 VITALS
DIASTOLIC BLOOD PRESSURE: 90 MMHG | BODY MASS INDEX: 28.13 KG/M2 | HEIGHT: 60 IN | RESPIRATION RATE: 16 BRPM | HEART RATE: 83 BPM | SYSTOLIC BLOOD PRESSURE: 120 MMHG | TEMPERATURE: 98.8 F | OXYGEN SATURATION: 99 % | WEIGHT: 143.3 LBS

## 2018-01-29 VITALS
OXYGEN SATURATION: 99 % | HEIGHT: 60 IN | BODY MASS INDEX: 28.13 KG/M2 | DIASTOLIC BLOOD PRESSURE: 90 MMHG | TEMPERATURE: 98.8 F | WEIGHT: 143.3 LBS | RESPIRATION RATE: 16 BRPM | HEART RATE: 83 BPM | SYSTOLIC BLOOD PRESSURE: 120 MMHG

## 2018-01-29 DIAGNOSIS — D75.839 THROMBOCYTOSIS: ICD-10-CM

## 2018-01-29 PROCEDURE — 36415 COLL VENOUS BLD VENIPUNCTURE: CPT | Performed by: INTERNAL MEDICINE

## 2018-01-29 PROCEDURE — 99203 OFFICE O/P NEW LOW 30 MIN: CPT | Performed by: INTERNAL MEDICINE

## 2018-01-29 PROCEDURE — 81270 JAK2 GENE: CPT

## 2018-01-29 ASSESSMENT — PAIN SCALES - GENERAL
PAINLEVEL: NO PAIN
PAINLEVEL: NO PAIN

## 2018-01-29 NOTE — PROGRESS NOTES
Dasha Luu is a 42 y.o. female here for a non-provider visit for: Lab Draws  on 1/29/2018 at 9:57 AM    Procedure Performed: Venipuncture     Anatomical site: Right Antecubital Area (AC)    Equipment used: 21g    Labs drawn: Miscellaneous lab test    Ordering Provider: Dr. VARINDER Wright By: Yolanda Rodriguez, Med Ass't  No Complications

## 2018-01-29 NOTE — PROGRESS NOTES
Consult Note: Hematology    Date of consultation: 1/29/2018     Referring provider: Anni Munoz M.D.    Reason for consultation:   CC:thrombocytosis    History of presenting illness:   Dasha Luu  is a 42 y.o. year old female first seen in clinic on 1/29/2018 for evaluation of thrombocytosis      Thrombocytosis  Location, blood  Severity, mild  Timing, constant  Modifying factors, no  Quality, platelet  Duration, 1st noticed June 2014   Context, discovered incidentally on routine blood work  Associated factors, not associated with any abnormal bleeding bruising or clotting    Past Medical History:    Past Medical History:   Diagnosis Date   • Anxiety    • Hyperglycemia    • Thrombocytosis (CMS-HCC)        Past surgical history:    Past Surgical History:   Procedure Laterality Date   • ABDOMINOPLASTY  6/15/2011    Performed by ESVIN BAILEY at SUR[facility]ERY [facility] ORS   • LIPOSUCTION  6/15/2011    Performed by ESVIN BAILEY at [facility] ORS   • MAMMOPLASTY AUGMENTATION  6/15/2011    Performed by ESVIN BAILEY at [facility] ORS       Allergies:  Penicillins    Medications:    Current Outpatient Prescriptions   Medication Sig Dispense Refill   • Cholecalciferol (VITAMIN D PO) Take  by mouth.     • multivitamin (THERAGRAN) Tab Take 1 Tab by mouth every day.     • alprazolam (XANAX) 0.25 MG Tab Take 1 Tab by mouth 2 times a day as needed for Anxiety for up to 60 days. 60 Tab 0   • ibuprofen (MOTRIN) 200 MG Tab Take 200 mg by mouth every 6 hours as needed.       No current facility-administered medications for this visit.        Social History:     Social History     Social History   • Marital status:      Spouse name: N/A   • Number of children: N/A   • Years of education: N/A     Occupational History   • Manicurist Self Employed     Social History Main Topics   • Smoking status: Former Smoker     Packs/day: 0.25     Years: 16.00     Types:  "Cigarettes   • Smokeless tobacco: Never Used   • Alcohol use No      Comment: 1-2 per week. non none   • Drug use: No   • Sexual activity: Yes     Partners: Male     Birth control/ protection: Surgical     Other Topics Concern   • Not on file     Social History Narrative   • No narrative on file       Family History:     Family History   Problem Relation Age of Onset   • Cancer Mother      tomor on spinal cord   • Diabetes Mother    • Hypertension Mother    • Alcohol/Drug Father    • Diabetes Sister      gestational dm   • Cancer Maternal Grandmother      breast   • Diabetes Maternal Grandmother    • Heart Disease Maternal Grandmother        Review of Systems:  Constitutional: No fever, chills, weight loss ,malaise/fatigue.      All other review of systems are negative except what was mentioned above in the HPI.    Physical Exam:  Vitals:   /90   Pulse 83   Temp 37.1 °C (98.8 °F)   Resp 16   Ht 1.511 m (4' 11.5\")   Wt 65 kg (143 lb 4.8 oz)   LMP 01/01/2018   SpO2 99%   Breastfeeding? No   BMI 28.46 kg/m²     General: Not in acute distress, alert and oriented x 3  HEENT: No pallor, icterus. Oropharynx clear.   Neck: Supple, no palpable masses.  Lymph nodes: No palpable cervical, supraclavicular, axillary or inguinal lymphadenopathy.    CVS: regular rate and rhythm, no rubs or gallops  RESP: Clear to auscultate bilaterally, no wheezing or crackles.   ABD: Soft, non tender, non distended, positive bowel sounds, no palpable organomegaly  EXT: No edema or cyanosis  CNS: Alert and oriented x3, No focal deficits.  Skin- No rash      Labs:   Results for BOB BLU RAMIRES (MRN [id number]) as of 1/29/2018 16:01   6/13/2011 09:07 6/16/2014 07:31 6/23/2016 07:09 1/6/2018 10:00   WBC 6.6 6.7 6.6 6.8   RBC 4.67 4.98 4.94 4.91   Hemoglobin 14.5 14.5 14.5 14.3   Hematocrit 41.9 45.1 45.6 43.2   MCV 89.9 91 92 88   MCH 31.1 29.1 29.4 29.1   MCHC 34.7 32.2 31.8 33.1   RDW 12.4 12.7 13.2 12.8   Platelet " Count 378 390 (H) 425 (H) 440 (H)     Imaging:    Assessment and Plan:  -Thrombocytosis  -We'll check a JAK2 mutation  -Patient will start aspirin 81 mg daily  -Patient has low risk of thrombosis  -Cytoreductive therapy not indicated at this time  -No indication for bone marrow biopsy at this time      She agreed and verbalized her agreement and understanding with the current plan.  I answered all questions and concerns she has at this time.  Dear  Anni Munoz M.D.,  Thank you for allowing me to participate in her care.    All labs and/or imaging studies mentioned in the note above were reviewed with and explained to the patient as a pertain to medical decision making.    Please note that this dictation was created using voice recognition software. I have made every reasonable attempt to correct obvious errors, but I expect that there are errors of grammar and possibly content that I did not discover before finalizing the note.      SIGNATURES:  Lion Aguilera    CC:  JAZMIN Fraga Emily A, M.D.

## 2018-02-02 LAB — TEST NAME 95000: NORMAL

## 2018-02-13 ENCOUNTER — TELEPHONE (OUTPATIENT)
Dept: HEMATOLOGY ONCOLOGY | Facility: MEDICAL CENTER | Age: 43
End: 2018-02-13

## 2018-02-26 ENCOUNTER — TELEPHONE (OUTPATIENT)
Dept: HEMATOLOGY ONCOLOGY | Facility: MEDICAL CENTER | Age: 43
End: 2018-02-26

## 2018-02-27 NOTE — TELEPHONE ENCOUNTER
I called patient back to inform her that the labs she thought  need to reorder and fax to [facility] were already done and that no other labs are necessary at this time. Patient didn't answer so I left a voicemail.

## 2018-03-15 ENCOUNTER — HOSPITAL ENCOUNTER (OUTPATIENT)
Facility: MEDICAL CENTER | Age: 43
End: 2018-03-15
Attending: INTERNAL MEDICINE
Payer: OTHER GOVERNMENT

## 2018-03-15 ENCOUNTER — NON-PROVIDER VISIT (OUTPATIENT)
Dept: HEMATOLOGY ONCOLOGY | Facility: MEDICAL CENTER | Age: 43
End: 2018-03-15
Payer: OTHER GOVERNMENT

## 2018-03-15 VITALS
OXYGEN SATURATION: 100 % | HEIGHT: 60 IN | BODY MASS INDEX: 28.18 KG/M2 | HEART RATE: 75 BPM | DIASTOLIC BLOOD PRESSURE: 86 MMHG | TEMPERATURE: 98.8 F | WEIGHT: 143.52 LBS | RESPIRATION RATE: 16 BRPM | SYSTOLIC BLOOD PRESSURE: 130 MMHG

## 2018-03-15 DIAGNOSIS — D75.839 THROMBOCYTOSIS: ICD-10-CM

## 2018-03-15 LAB
ERYTHROCYTE [DISTWIDTH] IN BLOOD BY AUTOMATED COUNT: 39.1 FL (ref 35.9–50)
HCT VFR BLD AUTO: 43.3 % (ref 37–47)
HGB BLD-MCNC: 14.2 G/DL (ref 12–16)
MCH RBC QN AUTO: 28.8 PG (ref 27–33)
MCHC RBC AUTO-ENTMCNC: 32.8 G/DL (ref 33.6–35)
MCV RBC AUTO: 87.8 FL (ref 81.4–97.8)
PLATELET # BLD AUTO: 433 K/UL (ref 164–446)
PMV BLD AUTO: 9.4 FL (ref 9–12.9)
RBC # BLD AUTO: 4.93 M/UL (ref 4.2–5.4)
WBC # BLD AUTO: 7.3 K/UL (ref 4.8–10.8)

## 2018-03-15 PROCEDURE — 99213 OFFICE O/P EST LOW 20 MIN: CPT | Performed by: INTERNAL MEDICINE

## 2018-03-15 PROCEDURE — 81402 MOPATH PROCEDURE LEVEL 3: CPT

## 2018-03-15 PROCEDURE — 85027 COMPLETE CBC AUTOMATED: CPT

## 2018-03-15 PROCEDURE — 36415 COLL VENOUS BLD VENIPUNCTURE: CPT | Performed by: INTERNAL MEDICINE

## 2018-03-15 ASSESSMENT — PAIN SCALES - GENERAL: PAINLEVEL: NO PAIN

## 2018-03-15 NOTE — PROGRESS NOTES
Consult Note: Hematology    Date of consultation: 3/15/2018     Referring provider: Anni Munoz M.D.    Reason for consultation:   CC:thrombocytosis    History of presenting illness:   Dasha Luu  is a 42 y.o. year old female first seen in clinic on 1/29/2018 for evaluation of thrombocytosis. She is accompanied by her  who provided some of the history. She denies any new or acute complaints.      Thrombocytosis  Location, blood  Severity, mild  Timing, constant  Modifying factors, no  Quality, platelet  Duration, 1st noticed June 2014   Context, discovered incidentally on routine blood work  Associated factors, not associated with any abnormal bleeding bruising or clotting    Past Medical History:    Past Medical History:   Diagnosis Date   • Anxiety    • Hyperglycemia    • Thrombocytosis (CMS-HCC)        Past surgical history:    Past Surgical History:   Procedure Laterality Date   • ABDOMINOPLASTY  6/15/2011    Performed by ESVIN BAILEY at SURGERY [facility]S   • LIPOSUCTION  6/15/2011    Performed by ESVIN BAILEY at [facility] ORS   • MAMMOPLASTY AUGMENTATION  6/15/2011    Performed by ESVIN BAILEY at [facility] ORS       Allergies:  Penicillins    Medications:    Current Outpatient Prescriptions   Medication Sig Dispense Refill   • Cholecalciferol (VITAMIN D PO) Take  by mouth.     • multivitamin (THERAGRAN) Tab Take 1 Tab by mouth every day.     • alprazolam (XANAX) 0.25 MG Tab Take 1 Tab by mouth 2 times a day as needed for Anxiety for up to 60 days. 60 Tab 0   • ibuprofen (MOTRIN) 200 MG Tab Take 200 mg by mouth every 6 hours as needed.       No current facility-administered medications for this visit.        Social History:     Social History     Social History   • Marital status:      Spouse name: N/A   • Number of children: N/A   • Years of education: N/A     Occupational History   • Manicurist Self Employed  "    Social History Main Topics   • Smoking status: Former Smoker     Packs/day: 0.25     Years: 16.00     Types: Cigarettes   • Smokeless tobacco: Never Used   • Alcohol use No      Comment: 1-2 per week. non none   • Drug use: No   • Sexual activity: Yes     Partners: Male     Birth control/ protection: Surgical     Other Topics Concern   • Not on file     Social History Narrative   • No narrative on file       Family History:     Family History   Problem Relation Age of Onset   • Cancer Mother      tomor on spinal cord   • Diabetes Mother    • Hypertension Mother    • Alcohol/Drug Father    • Diabetes Sister      gestational dm   • Cancer Maternal Grandmother      breast   • Diabetes Maternal Grandmother    • Heart Disease Maternal Grandmother        Review of Systems:  Constitutional: No fever, chills, weight loss ,malaise/fatigue.      All other review of systems are negative except what was mentioned above in the HPI.    Physical Exam:  Vitals:   /86   Pulse 75   Temp 37.1 °C (98.8 °F)   Resp 16   Ht 1.511 m (4' 11.5\")   Wt 65.1 kg (143 lb 8.3 oz)   SpO2 100%   BMI 28.50 kg/m²     General: Not in acute distress, alert and oriented x 3  HEENT: No pallor, icterus. Oropharynx clear.   Neck: Supple, no palpable masses.  Lymph nodes: No palpable cervical, supraclavicular, axillary or inguinal lymphadenopathy.    CVS: regular rate and rhythm, no rubs or gallops  RESP: Clear to auscultate bilaterally, no wheezing or crackles.   ABD: Soft, non tender, non distended, positive bowel sounds, no palpable organomegaly  EXT: No edema or cyanosis  CNS: Alert and oriented x3, No focal deficits.  Skin- No rash      Labs:   1/29/18  JAK2 Gene, V617F Mutation, Qualitative   Not Detected   There is no evidence of the JAK2 V617F point mutation by PCR   analysis. This result does not entirely exclude the possibility of   a JAK2 V617F mutation below the test limit of detection.   CALR testing will be performed.   This " result has been reviewed and approved by Hussein Irizarry M.D.,   Ph.D.     Imaging:    Assessment and Plan:  -Thrombocytosis  -Established stable  -Neg for JAK2 mutation Jan 29, 2018  -Continue aspirin 81 mg daily  -Patient has low risk of thrombosis  -Cytoreductive therapy not indicated at this time  -Discussed option for bone marrow biopsy at this time, she would like to defer at this time. Based on her age and risk it would not .   -Check for CALR,MPL mutation  -check us abd    She agreed and verbalized her agreement and understanding with the current plan.  I answered all questions and concerns she has at this time.  Dear  Anni Munoz M.D.,  Thank you for allowing me to participate in her care.    All labs and/or imaging studies mentioned in the note above were reviewed with and explained to the patient as a pertain to medical decision making.    Please note that this dictation was created using voice recognition software. I have made every reasonable attempt to correct obvious errors, but I expect that there are errors of grammar and possibly content that I did not discover before finalizing the note.      SIGNATURES:  Lion Aguilera    CC:  Anni Munoz M.D.  Anni Munoz M.D.

## 2018-03-21 LAB — TEST NAME 95000: NORMAL

## 2018-03-22 VITALS
DIASTOLIC BLOOD PRESSURE: 86 MMHG | HEIGHT: 60 IN | SYSTOLIC BLOOD PRESSURE: 130 MMHG | RESPIRATION RATE: 16 BRPM | HEART RATE: 75 BPM | TEMPERATURE: 98.8 F | BODY MASS INDEX: 28.07 KG/M2 | OXYGEN SATURATION: 100 % | WEIGHT: 143 LBS

## 2018-03-22 ASSESSMENT — PAIN SCALES - GENERAL: PAINLEVEL: NO PAIN

## 2018-03-22 NOTE — PROGRESS NOTES
Dasha Luu is a 42 y.o. female here for a non-provider visit for: Lab Draws  on 3/22/2018 at 10:55 AM    Procedure Performed: Venipuncture     Anatomical site: Right Antecubital Area (AC)    Equipment used: 21g    Labs drawn: CBC w/diff and Misc    Ordering Provider: Dr. VARINDER Aguilera    Kyle By: Yolanda Rodriguez, Vitaliy Ass't  No complications and Dr. Aguilera was present in the office the entire time that I was doing the patients blood draw.

## 2018-04-08 ENCOUNTER — HOSPITAL ENCOUNTER (OUTPATIENT)
Dept: RADIOLOGY | Facility: MEDICAL CENTER | Age: 43
End: 2018-04-08
Attending: INTERNAL MEDICINE
Payer: OTHER GOVERNMENT

## 2018-04-08 DIAGNOSIS — D75.839 THROMBOCYTOSIS: ICD-10-CM

## 2018-04-08 PROCEDURE — 76700 US EXAM ABDOM COMPLETE: CPT

## 2018-06-15 ENCOUNTER — OFFICE VISIT (OUTPATIENT)
Dept: URGENT CARE | Facility: CLINIC | Age: 43
End: 2018-06-15
Payer: OTHER GOVERNMENT

## 2018-06-15 VITALS
TEMPERATURE: 98.1 F | SYSTOLIC BLOOD PRESSURE: 114 MMHG | OXYGEN SATURATION: 98 % | WEIGHT: 138 LBS | HEART RATE: 79 BPM | BODY MASS INDEX: 27.09 KG/M2 | HEIGHT: 60 IN | RESPIRATION RATE: 14 BRPM | DIASTOLIC BLOOD PRESSURE: 84 MMHG

## 2018-06-15 DIAGNOSIS — J40 LARYNGOTRACHEOBRONCHITIS: ICD-10-CM

## 2018-06-15 DIAGNOSIS — J04.0 LARYNGITIS: ICD-10-CM

## 2018-06-15 DIAGNOSIS — J02.9 PHARYNGITIS, UNSPECIFIED ETIOLOGY: ICD-10-CM

## 2018-06-15 DIAGNOSIS — R05.9 COUGH: ICD-10-CM

## 2018-06-15 LAB
INT CON NEG: NEGATIVE
INT CON POS: POSITIVE
S PYO AG THROAT QL: NORMAL

## 2018-06-15 PROCEDURE — 87880 STREP A ASSAY W/OPTIC: CPT | Performed by: PHYSICIAN ASSISTANT

## 2018-06-15 PROCEDURE — 99203 OFFICE O/P NEW LOW 30 MIN: CPT | Performed by: PHYSICIAN ASSISTANT

## 2018-06-15 RX ORDER — ALPRAZOLAM 0.25 MG/1
0.25 TABLET ORAL NIGHTLY PRN
COMMUNITY
End: 2018-09-11 | Stop reason: SDUPTHER

## 2018-06-15 RX ORDER — DOXYCYCLINE HYCLATE 100 MG
100 TABLET ORAL 2 TIMES DAILY
Qty: 14 TAB | Refills: 0 | Status: SHIPPED | OUTPATIENT
Start: 2018-06-15 | End: 2018-06-22

## 2018-06-15 RX ORDER — BENZONATATE 100 MG/1
200 CAPSULE ORAL 3 TIMES DAILY PRN
Qty: 60 CAP | Refills: 0 | Status: SHIPPED | OUTPATIENT
Start: 2018-06-15 | End: 2018-06-20

## 2018-06-15 RX ORDER — ASPIRIN 81 MG/1
81 TABLET, CHEWABLE ORAL DAILY
COMMUNITY

## 2018-06-15 RX ORDER — METHYLPREDNISOLONE 4 MG/1
TABLET ORAL
Qty: 21 TAB | Refills: 0 | Status: SHIPPED | OUTPATIENT
Start: 2018-06-15 | End: 2018-11-01

## 2018-06-15 ASSESSMENT — ENCOUNTER SYMPTOMS
SPUTUM PRODUCTION: 0
HEMOPTYSIS: 0
SHORTNESS OF BREATH: 0
SORE THROAT: 1
WHEEZING: 1
COUGH: 1
FEVER: 0
CHILLS: 0

## 2018-06-15 NOTE — PATIENT INSTRUCTIONS
Pharyngitis  Pharyngitis is a sore throat (pharynx). There is redness, pain, and swelling of your throat.  Follow these instructions at home:  · Drink enough fluids to keep your pee (urine) clear or pale yellow.  · Only take medicine as told by your doctor.  ¨ You may get sick again if you do not take medicine as told. Finish your medicines, even if you start to feel better.  ¨ Do not take aspirin.  · Rest.  · Rinse your mouth (gargle) with salt water (½ tsp of salt per 1 qt of water) every 1-2 hours. This will help the pain.  · If you are not at risk for choking, you can suck on hard candy or sore throat lozenges.  Contact a doctor if:  · You have large, tender lumps on your neck.  · You have a rash.  · You cough up green, yellow-brown, or bloody spit.  Get help right away if:  · You have a stiff neck.  · You drool or cannot swallow liquids.  · You throw up (vomit) or are not able to keep medicine or liquids down.  · You have very bad pain that does not go away with medicine.  · You have problems breathing (not from a stuffy nose).  This information is not intended to replace advice given to you by your health care provider. Make sure you discuss any questions you have with your health care provider.  Document Released: 06/05/2009 Document Revised: 05/25/2017 Document Reviewed: 08/25/2014  [employer] Patient Education © 2017 [employer]

## 2018-06-15 NOTE — PROGRESS NOTES
"Subjective:   Dasha Luu is a 42 y.o. female who presents for Pharyngitis      Pt reports onset of sore throat, hoarse voice and cough 5 days ago. Cough is nonproductive, worse at night, keeping awake at night. No fever. No known exposure to strep or croup. Pt reports cough is severe to point that it is \"scary\". Daughter has been ill with similar. No prior hx of asthma.          Pharyngitis    This is a new problem. The current episode started in the past 7 days. The problem has been unchanged. There has been no fever. Associated symptoms include coughing. Pertinent negatives include no congestion, ear pain or shortness of breath.     Review of Systems   Constitutional: Negative for chills, fever and malaise/fatigue.   HENT: Positive for sore throat. Negative for congestion and ear pain.    Respiratory: Positive for cough and wheezing. Negative for hemoptysis, sputum production and shortness of breath.    Cardiovascular: Negative for chest pain.   Skin: Negative for rash.   All other systems reviewed and are negative.    Allergies   Allergen Reactions   • Penicillins Itching     redness        Objective:   /84   Pulse 79   Temp 36.7 °C (98.1 °F)   Resp 14   Ht 1.511 m (4' 11.5\")   Wt 62.6 kg (138 lb)   SpO2 98%   BMI 27.41 kg/m²   Physical Exam   Constitutional: She is oriented to person, place, and time. She appears well-developed and well-nourished.   Hoarse voice.   HENT:   Head: Normocephalic and atraumatic.   Right Ear: External ear normal.   Left Ear: External ear normal.   Nose: Nose normal.   Mouth/Throat: Posterior oropharyngeal edema and posterior oropharyngeal erythema present. No oropharyngeal exudate.   Eyes: Conjunctivae and EOM are normal. Pupils are equal, round, and reactive to light.   Neck: Normal range of motion. Neck supple.   Cardiovascular: Normal rate, regular rhythm and normal heart sounds.  Exam reveals no gallop and no friction rub.    No murmur " heard.  Pulmonary/Chest: Effort normal. No respiratory distress. She has no wheezes. She has no rales.   Coarse breath sounds with upper airway rhonchi.   Abdominal: Soft. Bowel sounds are normal. She exhibits no distension and no mass. There is no tenderness. There is no rebound and no guarding.   Musculoskeletal: Normal range of motion. She exhibits no edema, tenderness or deformity.   Lymphadenopathy:     She has no cervical adenopathy.   Neurological: She is alert and oriented to person, place, and time.   Skin: Skin is warm and dry. No rash noted.   Psychiatric: She has a normal mood and affect. Judgment normal.          Assessment/Plan:   Assessment    1. Laryngotracheobronchitis  - doxycycline (VIBRAMYCIN) 100 MG Tab; Take 1 Tab by mouth 2 times a day for 7 days.  Dispense: 14 Tab; Refill: 0  - MethylPREDNISolone (MEDROL DOSEPAK) 4 MG Tablet Therapy Pack; Use as directed  Dispense: 21 Tab; Refill: 0  - benzonatate (TESSALON) 100 MG Cap; Take 2 Caps by mouth 3 times a day as needed for Cough for up to 5 days.  Dispense: 60 Cap; Refill: 0    Patient will be treated with doxycycline and a Medrol Dosepak. She is given Tessalon Perles as needed for cough. May want to try adding Mucinex to her regimen.      2. Cough  - benzonatate (TESSALON) 100 MG Cap; Take 2 Caps by mouth 3 times a day as needed for Cough for up to 5 days.  Dispense: 60 Cap; Refill: 0 see #1 above.    3. Laryngitis  See #1 above. Recommend seeking and has normal tone as possible.    4. Pharyngitis, unspecified etiology  - POCT Rapid Strep A colon negative     Differential diagnosis, natural history, supportive care, and indications for immediate follow-up discussed.      If not improving in 3-5 days, F/U with PCP or return to  or sooner if worsens  Strict ER precautions given.    Please note that this note was created using voice recognition speech to text software. Every effort has been made to correct obvious errors.  However, I expect there  are errors of grammar and possibly context that were not discovered prior to finalizing the note

## 2018-06-22 ENCOUNTER — TELEPHONE (OUTPATIENT)
Dept: URGENT CARE | Facility: CLINIC | Age: 43
End: 2018-06-22

## 2018-06-22 NOTE — TELEPHONE ENCOUNTER
Received a phone message that the patient had contacted urgent care stating that she was still experiencing symptoms and wanted another round of antibiotics and steroids. I did attempt to reach the patient to discuss and ask for some questions. However she was not available in a voicemail was left for her to call me if the [place] clinic. I did inform her on her voicemail that if she is worse in any way I'm going to recommend that she be reevaluated and that I likely would not be repeating the steroids.

## 2018-06-30 ENCOUNTER — OFFICE VISIT (OUTPATIENT)
Dept: URGENT CARE | Facility: CLINIC | Age: 43
End: 2018-06-30
Payer: OTHER GOVERNMENT

## 2018-06-30 VITALS
BODY MASS INDEX: 27.29 KG/M2 | WEIGHT: 139 LBS | HEIGHT: 60 IN | RESPIRATION RATE: 16 BRPM | SYSTOLIC BLOOD PRESSURE: 110 MMHG | DIASTOLIC BLOOD PRESSURE: 80 MMHG | HEART RATE: 77 BPM | TEMPERATURE: 97.4 F | OXYGEN SATURATION: 100 %

## 2018-06-30 DIAGNOSIS — R07.0 THROAT PAIN IN ADULT: ICD-10-CM

## 2018-06-30 DIAGNOSIS — R09.82 POST-NASAL DRIP: ICD-10-CM

## 2018-06-30 PROCEDURE — 99214 OFFICE O/P EST MOD 30 MIN: CPT | Performed by: FAMILY MEDICINE

## 2018-06-30 NOTE — PROGRESS NOTES
HPI: Dasha Luu is a 42 y.o. female who presents with   Chief Complaint   Patient presents with   • Pharyngitis     completed medication, all symptoms have return, not as bad, woke last night feeling not able to breath      Patient presents to urgent care with acute onset of new symptoms cough at night feeling of choking sore throat denies fevers or chills no nausea vomiting or diarrhea she was seen earlier in the month of prescribed medications for similar types of symptoms that did resolve.    She has never suffered from seasonal allergies before. Denies any chest pain or feeling lightheaded or faint.  Worsened by: activity, laying supine at night, first thing in the morning, when exposed to outside allergens  Improved by: OTC symptomatic medictions    Ros Review of Systems performed. All other systems are negative except for what is listed above.     PMH:  has a past medical history of Anxiety; Hyperglycemia; and Thrombocytosis (HCC).  MEDS:   Current Outpatient Prescriptions:   •  mag hydrox-al hydrox-simeth-diphenhydrAMINE-lidocaine viscous 2%, Benadyl/Mylanta or Maalox/Xylocaine (1:1:1:) 5-10cc po gargle and spit or swallow q4-6hrs prn sore throat, Disp: 90 mL, Rfl: 0  •  fluticasone (VERAMYST) 27.5 MCG/SPRAY nasal spray, Spray 1 Spray in nose every day., Disp: 10 g, Rfl: 0  •  aspirin (ASA) 81 MG Chew Tab chewable tablet, Take 81 mg by mouth every day., Disp: , Rfl:   •  ALPRAZolam (XANAX) 0.25 MG Tab, Take 0.25 mg by mouth at bedtime as needed for Sleep., Disp: , Rfl:   •  MethylPREDNISolone (MEDROL DOSEPAK) 4 MG Tablet Therapy Pack, Use as directed, Disp: 21 Tab, Rfl: 0  •  Cholecalciferol (VITAMIN D PO), Take  by mouth., Disp: , Rfl:   •  multivitamin (THERAGRAN) Tab, Take 1 Tab by mouth every day., Disp: , Rfl:   •  ibuprofen (MOTRIN) 200 MG Tab, Take 200 mg by mouth every 6 hours as needed., Disp: , Rfl:   ALLERGIES:   Allergies   Allergen Reactions   • Penicillins Itching      "redness   SURGHX:   Past Surgical History:   Procedure Laterality Date   • ABDOMINOPLASTY  6/15/2011    Performed by ESVIN BAILEY at SURGERY [facility]   • LIPOSUCTION  6/15/2011    Performed by ESVIN BAILEY at SURGERY [facility]   • MAMMOPLASTY AUGMENTATION  6/15/2011    Performed by ESVIN BAILEY at SURGERY [facility]   SOCHX:  reports that she has quit smoking. Her smoking use included Cigarettes. She has a 4.00 pack-year smoking history. She has never used smokeless tobacco. She reports that she does not drink alcohol or use drugs.  FH: Family history was reviewed, no pertinent findings to report    PE:  Vitals /80   Pulse 77   Temp 36.3 °C (97.4 °F)   Resp 16   Ht 1.511 m (4' 11.5\")   Wt 63 kg (139 lb)   SpO2 100%   BMI 27.60 kg/m²    Gen AOx4, NAD  HEENT: moist mucus membranes, no pain or pressure with percussion of frontal, maxillary or ethmoid sinuses.  Bilateral conjunciva clear without erythema or exudate,  Bilateral TM's clear without bulge, fluid or loss of landmarks, mild pharyngeal erythema without tonsillar exudate or tonsillar enlargement, mild hoarse voice is present  Neck: supple, no cervical lymphadenopathy, no signs of menigismus  CV/PULM: RRR no murmurs, no rales ronchi or wheezes, no signs of resp distress  Abd soft nontender, bs present  Skin no rashes  Extremities -c/c/e  Neuro appropriate affect,     A/p  1. Throat pain in adult  mag hydrox-al hydrox-simeth-diphenhydrAMINE-lidocaine viscous 2%   2. Post-nasal drip  fluticasone (VERAMYST) 27.5 MCG/SPRAY nasal spray     Differential diagnosis, natural history, supportive care discussed. Follow-up with primary care provider within 7-10 days, emergency room precautions discussed.  Patient and/or family appears understanding of information.    Do not see anything infectious going on requiring antibiotics I believe this is more inflammatory  "

## 2018-07-19 ENCOUNTER — APPOINTMENT (OUTPATIENT)
Dept: HEMATOLOGY ONCOLOGY | Facility: MEDICAL CENTER | Age: 43
End: 2018-07-19
Payer: OTHER GOVERNMENT

## 2018-07-23 ENCOUNTER — OFFICE VISIT (OUTPATIENT)
Dept: HEMATOLOGY ONCOLOGY | Facility: MEDICAL CENTER | Age: 43
End: 2018-07-23
Payer: OTHER GOVERNMENT

## 2018-07-23 VITALS
OXYGEN SATURATION: 100 % | TEMPERATURE: 98.4 F | DIASTOLIC BLOOD PRESSURE: 60 MMHG | HEART RATE: 64 BPM | SYSTOLIC BLOOD PRESSURE: 108 MMHG | RESPIRATION RATE: 18 BRPM | HEIGHT: 59 IN | BODY MASS INDEX: 28.58 KG/M2 | WEIGHT: 141.76 LBS

## 2018-07-23 DIAGNOSIS — D75.839 THROMBOCYTOSIS: ICD-10-CM

## 2018-07-23 PROCEDURE — 99213 OFFICE O/P EST LOW 20 MIN: CPT | Performed by: INTERNAL MEDICINE

## 2018-07-23 ASSESSMENT — PAIN SCALES - GENERAL: PAINLEVEL: NO PAIN

## 2018-07-23 NOTE — PROGRESS NOTES
Consult Note: Hematology    Date of consultation: 7/23/2018     Referring provider: Anni Munoz M.D.    Reason for consultation:   CC:thrombocytosis    History of presenting illness:   Dasha Luu  is a 42 y.o. year old female first seen in clinic on 1/29/2018 for evaluation of thrombocytosis. She is accompanied by her  who provided some of the history. She denies any new or acute complaints.      Interval History: 7/23/2018  Dasha Luu is a 42 y.o. female seen in clinic today for follow up. Denies any acute complaints      Thrombocytosis  Location, blood  Severity, mild  Timing, constant  Modifying factors, no  Quality, platelet  Duration, 1st noticed June 2014   Context, discovered incidentally on routine blood work  Associated factors, not associated with any abnormal bleeding bruising or clotting    Past Medical History:    Past Medical History:   Diagnosis Date   • Anxiety    • Hyperglycemia    • Thrombocytosis (HCC)        Past surgical history:    Past Surgical History:   Procedure Laterality Date   • ABDOMINOPLASTY  6/15/2011    Performed by ESVIN BAILEY at SURGERY [facility] ORS   • LIPOSUCTION  6/15/2011    Performed by ESVIN BAILEY at [facility] ORS   • MAMMOPLASTY AUGMENTATION  6/15/2011    Performed by ESVIN BAILEY at [facility] ORS       Allergies:  Penicillins    Medications:    Current Outpatient Prescriptions   Medication Sig Dispense Refill   • mag hydrox-al hydrox-simeth-diphenhydrAMINE-lidocaine viscous 2% Benadyl/Mylanta or Maalox/Xylocaine (1:1:1:) 5-10cc po gargle and spit or swallow q4-6hrs prn sore throat 90 mL 0   • fluticasone (VERAMYST) 27.5 MCG/SPRAY nasal spray [place] 1 Spray in nose every day. 10 g 0   • aspirin (ASA) 81 MG Chew Tab chewable tablet Take 81 mg by mouth every day.     • ALPRAZolam (XANAX) 0.25 MG Tab Take 0.25 mg by mouth at bedtime as needed for Sleep.     •  "MethylPREDNISolone (MEDROL DOSEPAK) 4 MG Tablet Therapy Pack Use as directed 21 Tab 0   • Cholecalciferol (VITAMIN D PO) Take  by mouth.     • multivitamin (THERAGRAN) Tab Take 1 Tab by mouth every day.     • ibuprofen (MOTRIN) 200 MG Tab Take 200 mg by mouth every 6 hours as needed.       No current facility-administered medications for this visit.        Social History:     Social History     Social History   • Marital status:      Spouse name: N/A   • Number of children: N/A   • Years of education: N/A     Occupational History   • Manicurist Self Employed     Social History Main Topics   • Smoking status: Former Smoker     Packs/day: 0.25     Years: 16.00     Types: Cigarettes   • Smokeless tobacco: Never Used   • Alcohol use No      Comment: 1-2 per week. non none   • Drug use: No   • Sexual activity: Yes     Partners: Male     Birth control/ protection: Surgical     Other Topics Concern   • Not on file     Social History Narrative   • No narrative on file       Family History:     Family History   Problem Relation Age of Onset   • Cancer Mother      tomor on spinal cord   • Diabetes Mother    • Hypertension Mother    • Alcohol/Drug Father    • Diabetes Sister      gestational dm   • Cancer Maternal Grandmother      breast   • Diabetes Maternal Grandmother    • Heart Disease Maternal Grandmother        Review of Systems:  Constitutional: No fever, chills, weight loss ,malaise/fatigue.      All other review of systems are negative except what was mentioned above in the HPI.    Physical Exam:  Vitals:   /60   Pulse 64   Temp 36.9 °C (98.4 °F)   Resp 18   Ht 1.499 m (4' 11\")   Wt 64.3 kg (141 lb 12.1 oz)   SpO2 100%   BMI 28.63 kg/m²     General: Not in acute distress, alert and oriented x 3  HEENT: No pallor, icterus. Oropharynx clear.   Neck: Supple, no palpable masses.  Lymph nodes: No palpable cervical, supraclavicular, axillary or inguinal lymphadenopathy.    CVS: regular rate and rhythm, " no rubs or gallops  RESP: Clear to auscultate bilaterally, no wheezing or crackles.   ABD: Soft, non tender, non distended, positive bowel sounds, no palpable organomegaly  EXT: No edema or cyanosis  CNS: Alert and oriented x3, No focal deficits.  Skin- No rash      Labs:   1/29/18  JAK2 Gene, V617F Mutation, Qualitative   Not Detected   There is no evidence of the JAK2 V617F point mutation by PCR   analysis. This result does not entirely exclude the possibility of   a JAK2 V617F mutation below the test limit of detection.   CALR testing will be performed.   This result has been reviewed and approved by Hussein Irizarry M.D.,   Ph.D.     Test name                     Result Flag Units  RefIntvl   -------------------------------------------------------------   MPL codon 515 results   Not Detected   A mutation in codon 515 was not detected in the MPL gene. This   result does not entirely exclude the possibility of a MPL codon   515 mutation below the test limit of detection or a MPL mutation   at another location in the gene.   This result has been reviewed and approved by Faizan Lechuga M.D.   INTERPRETIVE INFORMATION: MPL codon 515 Mutation, Quant   This test is designed to detect mutations in exon 10, codon 515,   of the MPL gene, including W515K and W515L. Detection and   quantitation of MPL mutations is used for diagnosis and monitoring   of patients with myeloproliferative neoplasms and suggest either   primary myelofibrosis (PMF) or essential thrombocythemia (ET) in a   subset of patients with non-mutated JAK2.   METHODOLOGY: DNA is isolated and amplified for codon 515 of the   MPL gene.  The resulting amplicons are subjected to pyrosequencing   to detect and quantify mutations.  Detected mutations are reported   as percent mutant.     Imaging:  TECHNIQUE/EXAM DESCRIPTION AND NUMBER OF VIEWS:  Complete abdomen survey.    COMPARISON: None    FINDINGS:  The liver is normal in contour. There is no evidence of solid  mass lesion. The liver measures 15.42 cm.    The gallbladder is normal. There is no evidence of cholelithiasis. The gallbladder wall thickness measures 0.20 cm  There is no pericholecystic fluid.    The common duct measures 0.43 cm.    The visualized pancreas is unremarkable.  The visualized aorta is normal in caliber.    Intrahepatic IVC is patent.    The portal vein is patent with hepatopetal flow measuring 9.3 mm.    The right kidney measures 10.33 cm.  The left kidney measures 12.55 cm.  There is no hydronephrosis.    The spleen measures 9.82 cm maximally.    The bladder demonstrates no focal wall abnormality.    There is no ascites.  Assessment and Plan:  -Thrombocytosis  -Established stable  -Neg for JAK2 mutation Jan 29, 2018, neg MPL 3/15/18  -check for CALR mutation,  -check CBC today   -Patient has low risk of thrombosis  -Cytoreductive therapy not indicated at this time  -Discussed option for bone marrow biopsy at this time, she would like to defer at this time. Based on her age and risk it would not .   -us abd, reviewed. WNL    She agreed and verbalized her agreement and understanding with the current plan.  I answered all questions and concerns she has at this time.  Dear  Anni Munoz M.D.,  Thank you for allowing me to participate in her care.    All labs and/or imaging studies mentioned in the note above were reviewed with and explained to the patient as a pertain to medical decision making.    Please note that this dictation was created using voice recognition software. I have made every reasonable attempt to correct obvious errors, but I expect that there are errors of grammar and possibly content that I did not discover before finalizing the note.      SIGNATURES:  Lion Aguilera    CC:  JAZMIN Fraga Emily A, M.D.

## 2018-08-23 ENCOUNTER — TELEPHONE (OUTPATIENT)
Dept: MEDICAL GROUP | Facility: MEDICAL CENTER | Age: 43
End: 2018-08-23

## 2018-08-23 NOTE — TELEPHONE ENCOUNTER
VOICEMAIL  1. Caller Name: Dasha Luu                      Call Back Number: [phone] (home)       2. Message: Pt left a voicemail requesting diagnostic mammogram orders be placed due to pain in L Breast. Please advise    3. Patient approves office to leave a detailed voicemail/MyChart message: N\A

## 2018-08-27 NOTE — TELEPHONE ENCOUNTER
Per Dr. Munoz, pt should be seen for an appointment regarding L Breast pain, stating she may need an US as well, all of which we can order at an appointment. LVM for pt to call and schedule an appt

## 2018-09-07 ENCOUNTER — OFFICE VISIT (OUTPATIENT)
Dept: MEDICAL GROUP | Facility: MEDICAL CENTER | Age: 43
End: 2018-09-07
Payer: OTHER GOVERNMENT

## 2018-09-07 VITALS
TEMPERATURE: 98.6 F | HEIGHT: 59 IN | RESPIRATION RATE: 20 BRPM | OXYGEN SATURATION: 95 % | DIASTOLIC BLOOD PRESSURE: 80 MMHG | SYSTOLIC BLOOD PRESSURE: 136 MMHG | WEIGHT: 135 LBS | HEART RATE: 85 BPM | BODY MASS INDEX: 27.21 KG/M2

## 2018-09-07 DIAGNOSIS — Z12.31 ENCOUNTER FOR SCREENING MAMMOGRAM FOR BREAST CANCER: ICD-10-CM

## 2018-09-07 DIAGNOSIS — N64.4 BREAST PAIN: ICD-10-CM

## 2018-09-07 DIAGNOSIS — F41.1 GAD (GENERALIZED ANXIETY DISORDER): ICD-10-CM

## 2018-09-07 PROCEDURE — 99213 OFFICE O/P EST LOW 20 MIN: CPT | Performed by: FAMILY MEDICINE

## 2018-09-11 DIAGNOSIS — F41.1 GAD (GENERALIZED ANXIETY DISORDER): ICD-10-CM

## 2018-09-12 NOTE — PROGRESS NOTES
Was the patient seen in the last year in this department? Yes    Does patient have an active prescription for medications requested? No     Received Request Via: Pharmacy     Pt was seen last week and never received her aprazolam refill.

## 2018-09-13 RX ORDER — ALPRAZOLAM 0.25 MG/1
0.25 TABLET ORAL NIGHTLY PRN
Qty: 30 TAB | Refills: 0 | Status: SHIPPED
Start: 2018-09-13 | End: 2018-10-13

## 2018-09-14 ENCOUNTER — HOSPITAL ENCOUNTER (OUTPATIENT)
Dept: RADIOLOGY | Facility: MEDICAL CENTER | Age: 43
End: 2018-09-14
Attending: FAMILY MEDICINE
Payer: OTHER GOVERNMENT

## 2018-09-14 DIAGNOSIS — Z12.31 ENCOUNTER FOR SCREENING MAMMOGRAM FOR BREAST CANCER: ICD-10-CM

## 2018-09-14 PROCEDURE — 77067 SCR MAMMO BI INCL CAD: CPT

## 2018-09-15 NOTE — PROGRESS NOTES
Subjective:     Chief Complaint   Patient presents with   • Breast Pain     unable to do mammogram due to breast pain   • Orders Needed     Lab Orders for diabetes and thyroid   • Medication Refill     xanax       Dasha Luu is a 43 y.o. female here today for evaluation and management of:    Breast pain  Patient complains of left sided breast pain.  She has implants in place so has been reluctant to do a mammogram.  She denies any nipple discharge.    TANVIR (generalized anxiety disorder)  Stable. Currently taking Xanax 0.25 mg occasionally as prescribed.  She would like a refill  Denies side effects and is tolerating well.  Mood is improved with current medication and therapy.    Patient denies SI/HI.             Allergies   Allergen Reactions   • Penicillins Itching     redness       Current medicines (including changes today)  Current Outpatient Prescriptions   Medication Sig Dispense Refill   • ALPRAZolam (XANAX) 0.25 MG Tab Take 1 Tab by mouth at bedtime as needed for Sleep for up to 30 days. 30 Tab 0   • mag hydrox-al hydrox-simeth-diphenhydrAMINE-lidocaine viscous 2% Benadyl/Mylanta or Maalox/Xylocaine (1:1:1:) 5-10cc po gargle and spit or swallow q4-6hrs prn sore throat 90 mL 0   • fluticasone (VERAMYST) 27.5 MCG/SPRAY nasal spray [place] 1 Spray in nose every day. 10 g 0   • aspirin (ASA) 81 MG Chew Tab chewable tablet Take 81 mg by mouth every day.     • MethylPREDNISolone (MEDROL DOSEPAK) 4 MG Tablet Therapy Pack Use as directed 21 Tab 0   • Cholecalciferol (VITAMIN D PO) Take  by mouth.     • multivitamin (THERAGRAN) Tab Take 1 Tab by mouth every day.     • ibuprofen (MOTRIN) 200 MG Tab Take 200 mg by mouth every 6 hours as needed.       No current facility-administered medications for this visit.        She  has a past medical history of Anxiety; Hyperglycemia; and Thrombocytosis (HCC).    Patient Active Problem List    Diagnosis Date Noted   • Breast pain 09/07/2018   • Well woman  "exam with routine gynecological exam 07/18/2017   • Dizziness 06/16/2017   • SOB (shortness of breath) 06/16/2017   • Heart palpitations 06/16/2017   • LLQ abdominal pain 06/16/2017   • Screening for cervical cancer 12/02/2016   • Dyslipidemia 12/02/2016   • Vitamin D deficiency 12/02/2016   • History of tobacco abuse 06/17/2016   • Thyromegaly 06/17/2016   • Hyperglycemia    • Thrombocytosis (HCC)    • External hemorrhoids 06/20/2014   • TANVIR (generalized anxiety disorder) 06/20/2014       ROS   No fever or chills.  No nausea or vomiting.  No chest pain or palpitations.  No cough or SOB.  No pain with urination or hematuria.  No black or bloody stools.       Objective:     Blood pressure 136/80, pulse 85, temperature 37 °C (98.6 °F), resp. rate 20, height 1.499 m (4' 11\"), weight 61.2 kg (135 lb), SpO2 95 %. Body mass index is 27.27 kg/m².   Physical Exam:  Well developed, well nourished.  Alert, oriented in no acute distress.  Eye contact is good, speech goal directed, affect calm  Eyes: conjunctiva non-injected, sclera non-icteric.  Neck Supple.  No adenopathy or masses in the neck or supraclavicular regions. No thyromegaly  Lungs: clear to auscultation bilaterally with good excursion. No wheezes or rhonchi  CV: regular rate and rhythm. No murmur  BREAST EXAM: No skin changes, peau d'orange or nipple retraction. No discharge. No axillary or supraclavicular adenopathy.  Implants bilaterally.  Tenderness to palpation in the left breast without any discernible masses.              Assessment and Plan:   The following treatment plan was discussed    1. Breast pain  Schedule mammogram with implant    2. Encounter for screening mammogram for breast cancer  See #1  - MA-SCREEN MAMMO W/IMPLANTS W/CAD-BILAT; Future    3. TANVIR (generalized anxiety disorder)  Refill Xanax    Any change or worsening of signs or symptoms, patient encouraged to follow-up or report to the emergency room for further evaluation. Patient understands " and agrees.    Followup: Return if symptoms worsen or fail to improve.

## 2018-09-15 NOTE — ASSESSMENT & PLAN NOTE
Patient complains of left sided breast pain.  She has implants in place so has been reluctant to do a mammogram.  She denies any nipple discharge.

## 2018-10-18 DIAGNOSIS — R73.9 HYPERGLYCEMIA: ICD-10-CM

## 2018-10-18 DIAGNOSIS — E55.9 VITAMIN D DEFICIENCY: ICD-10-CM

## 2018-10-18 DIAGNOSIS — D75.839 THROMBOCYTOSIS: ICD-10-CM

## 2018-10-18 DIAGNOSIS — E78.5 DYSLIPIDEMIA: ICD-10-CM

## 2018-10-20 ENCOUNTER — HOSPITAL ENCOUNTER (OUTPATIENT)
Dept: LAB | Facility: MEDICAL CENTER | Age: 43
End: 2018-10-20
Attending: FAMILY MEDICINE
Payer: OTHER GOVERNMENT

## 2018-10-20 ENCOUNTER — HOSPITAL ENCOUNTER (OUTPATIENT)
Dept: LAB | Facility: MEDICAL CENTER | Age: 43
End: 2018-10-20
Attending: INTERNAL MEDICINE
Payer: OTHER GOVERNMENT

## 2018-10-20 DIAGNOSIS — D75.839 THROMBOCYTOSIS: ICD-10-CM

## 2018-10-20 LAB
25(OH)D3 SERPL-MCNC: 35 NG/ML (ref 30–100)
BASOPHILS # BLD AUTO: 0.5 % (ref 0–1.8)
BASOPHILS # BLD AUTO: 0.8 % (ref 0–1.8)
BASOPHILS # BLD: 0.03 K/UL (ref 0–0.12)
BASOPHILS # BLD: 0.05 K/UL (ref 0–0.12)
CHOLEST SERPL-MCNC: 176 MG/DL (ref 100–199)
EOSINOPHIL # BLD AUTO: 0.09 K/UL (ref 0–0.51)
EOSINOPHIL # BLD AUTO: 0.09 K/UL (ref 0–0.51)
EOSINOPHIL NFR BLD: 1.5 % (ref 0–6.9)
EOSINOPHIL NFR BLD: 1.5 % (ref 0–6.9)
ERYTHROCYTE [DISTWIDTH] IN BLOOD BY AUTOMATED COUNT: 38.5 FL (ref 35.9–50)
ERYTHROCYTE [DISTWIDTH] IN BLOOD BY AUTOMATED COUNT: 38.5 FL (ref 35.9–50)
FASTING STATUS PATIENT QL REPORTED: NORMAL
HCT VFR BLD AUTO: 44.1 % (ref 37–47)
HCT VFR BLD AUTO: 45.2 % (ref 37–47)
HDLC SERPL-MCNC: 57 MG/DL
HGB BLD-MCNC: 14.7 G/DL (ref 12–16)
HGB BLD-MCNC: 14.9 G/DL (ref 12–16)
IMM GRANULOCYTES # BLD AUTO: 0.01 K/UL (ref 0–0.11)
IMM GRANULOCYTES # BLD AUTO: 0.02 K/UL (ref 0–0.11)
IMM GRANULOCYTES NFR BLD AUTO: 0.2 % (ref 0–0.9)
IMM GRANULOCYTES NFR BLD AUTO: 0.3 % (ref 0–0.9)
LDLC SERPL CALC-MCNC: 106 MG/DL
LYMPHOCYTES # BLD AUTO: 1.43 K/UL (ref 1–4.8)
LYMPHOCYTES # BLD AUTO: 1.47 K/UL (ref 1–4.8)
LYMPHOCYTES NFR BLD: 23.6 % (ref 22–41)
LYMPHOCYTES NFR BLD: 24 % (ref 22–41)
MCH RBC QN AUTO: 28.4 PG (ref 27–33)
MCH RBC QN AUTO: 29.6 PG (ref 27–33)
MCHC RBC AUTO-ENTMCNC: 32.5 G/DL (ref 33.6–35)
MCHC RBC AUTO-ENTMCNC: 33.8 G/DL (ref 33.6–35)
MCV RBC AUTO: 87.4 FL (ref 81.4–97.8)
MCV RBC AUTO: 87.5 FL (ref 81.4–97.8)
MONOCYTES # BLD AUTO: 0.31 K/UL (ref 0–0.85)
MONOCYTES # BLD AUTO: 0.33 K/UL (ref 0–0.85)
MONOCYTES NFR BLD AUTO: 5.1 % (ref 0–13.4)
MONOCYTES NFR BLD AUTO: 5.4 % (ref 0–13.4)
NEUTROPHILS # BLD AUTO: 4.16 K/UL (ref 2–7.15)
NEUTROPHILS # BLD AUTO: 4.2 K/UL (ref 2–7.15)
NEUTROPHILS NFR BLD: 68 % (ref 44–72)
NEUTROPHILS NFR BLD: 69.1 % (ref 44–72)
NRBC # BLD AUTO: 0 K/UL
NRBC # BLD AUTO: 0 K/UL
NRBC BLD-RTO: 0 /100 WBC
NRBC BLD-RTO: 0 /100 WBC
PLATELET # BLD AUTO: 400 K/UL (ref 164–446)
PLATELET # BLD AUTO: 405 K/UL (ref 164–446)
PMV BLD AUTO: 9.8 FL (ref 9–12.9)
PMV BLD AUTO: 9.8 FL (ref 9–12.9)
RBC # BLD AUTO: 5.04 M/UL (ref 4.2–5.4)
RBC # BLD AUTO: 5.17 M/UL (ref 4.2–5.4)
TRIGL SERPL-MCNC: 64 MG/DL (ref 0–149)
WBC # BLD AUTO: 6.1 K/UL (ref 4.8–10.8)
WBC # BLD AUTO: 6.1 K/UL (ref 4.8–10.8)

## 2018-10-20 PROCEDURE — 36415 COLL VENOUS BLD VENIPUNCTURE: CPT

## 2018-10-20 PROCEDURE — 82306 VITAMIN D 25 HYDROXY: CPT

## 2018-10-20 PROCEDURE — 85025 COMPLETE CBC W/AUTO DIFF WBC: CPT

## 2018-10-20 PROCEDURE — 85025 COMPLETE CBC W/AUTO DIFF WBC: CPT | Mod: 91

## 2018-10-20 PROCEDURE — 80061 LIPID PANEL: CPT

## 2018-10-20 PROCEDURE — 81219 CALR GENE COM VARIANTS: CPT

## 2018-10-27 LAB — GENE XXX MUT ANL BLD/T: NOT DETECTED

## 2018-11-01 ENCOUNTER — TELEPHONE (OUTPATIENT)
Dept: HEMATOLOGY ONCOLOGY | Facility: MEDICAL CENTER | Age: 43
End: 2018-11-01

## 2018-11-01 ENCOUNTER — OFFICE VISIT (OUTPATIENT)
Dept: MEDICAL GROUP | Facility: LAB | Age: 43
End: 2018-11-01
Payer: OTHER GOVERNMENT

## 2018-11-01 ENCOUNTER — HOSPITAL ENCOUNTER (OUTPATIENT)
Facility: MEDICAL CENTER | Age: 43
End: 2018-11-01
Attending: FAMILY MEDICINE
Payer: OTHER GOVERNMENT

## 2018-11-01 VITALS
WEIGHT: 130 LBS | RESPIRATION RATE: 16 BRPM | TEMPERATURE: 99.1 F | SYSTOLIC BLOOD PRESSURE: 120 MMHG | HEIGHT: 59 IN | BODY MASS INDEX: 26.21 KG/M2 | HEART RATE: 85 BPM | DIASTOLIC BLOOD PRESSURE: 64 MMHG | OXYGEN SATURATION: 95 %

## 2018-11-01 DIAGNOSIS — R73.9 HYPERGLYCEMIA: ICD-10-CM

## 2018-11-01 DIAGNOSIS — F41.1 GAD (GENERALIZED ANXIETY DISORDER): ICD-10-CM

## 2018-11-01 DIAGNOSIS — Z11.3 SCREENING FOR STDS (SEXUALLY TRANSMITTED DISEASES): ICD-10-CM

## 2018-11-01 PROCEDURE — 87591 N.GONORRHOEAE DNA AMP PROB: CPT

## 2018-11-01 PROCEDURE — 87491 CHLMYD TRACH DNA AMP PROBE: CPT

## 2018-11-01 PROCEDURE — 99214 OFFICE O/P EST MOD 30 MIN: CPT | Performed by: FAMILY MEDICINE

## 2018-11-01 RX ORDER — ALPRAZOLAM 0.25 MG/1
0.25 TABLET ORAL 2 TIMES DAILY PRN
Qty: 60 TAB | Refills: 0 | Status: SHIPPED | OUTPATIENT
Start: 2018-11-01 | End: 2018-12-01

## 2018-11-01 NOTE — TELEPHONE ENCOUNTER
----- Message from DEBBIE Reyna sent at 11/1/2018  9:05 AM PDT -----  Please let the patient know that her recent CBC looked good.  There is no abnormal findings of her platelet count.  They were within normal limits.  I did review the labs with Dr. Ruvalcaba as this was a previous Dr. Aguilera patient.  We have recommended that she continue to follow-up with her primary care provider in the future.  If needed she can be referred to another hematologist in the future, but at this time everything is looking good and within normal limits.

## 2018-11-01 NOTE — TELEPHONE ENCOUNTER
Patient had repeat CBC after being monitored for thrombocytosis by Dr. Aguilera, hematologist.  CBC is within normal limits and platelets are within normal limits at 405.  Dr. Aguilera is no longer practicing in this office and will have patient follow-up with primary care provider as her labs are within normal limits.  I have notified the PCP as well.  If repeat labs in the future note trending up of platelet count patient can be re-referred to hematology and patient will be established with a new hematologist at that time if needed.  I did review these labs with Dr. Ruvalcaba as well who agreed with the plan.

## 2018-11-01 NOTE — PATIENT INSTRUCTIONS
"Stress  Stress-related medical problems are becoming increasingly common.  The body has a built-in physical response to stressful situations. Faced with pressure, challenge or danger, we need to react quickly. Our bodies release hormones such as cortisol and adrenaline to help do this. These hormones are part of the \"fight or flight\" response and affect the metabolic rate, heart rate and blood pressure, resulting in a heightened, stressed state that prepares the body for optimum performance in dealing with a stressful situation.  It is likely that early man required these mechanisms to stay alive, but usually modern stresses do not call for this, and the same hormones released in today's world can damage health and reduce coping ability.  CAUSES  · Pressure to perform at work, at school or in sports.  · Threats of physical violence.  · Money worries.  · Arguments.  · Family conflicts.  · Divorce or separation from significant other.  · Bereavement.  · New job or unemployment.  · Changes in location.  · Alcohol or drug abuse.  SOMETIMES, THERE IS NO PARTICULAR REASON FOR DEVELOPING STRESS.  Almost all people are at risk of being stressed at some time in their lives. It is important to know that some stress is temporary and some is long term.  · Temporary stress will go away when a situation is resolved. Most people can cope with short periods of stress, and it can often be relieved by relaxing, taking a walk, chatting through issues with friends, or having a good night's sleep.  · Chronic (long-term, continuous) stress is much harder to deal with. It can be psychologically and emotionally damaging. It can be harmful both for an individual and for friends and family.  SYMPTOMS  Everyone reacts to stress differently. There are some common effects that help us recognize it. In times of extreme stress, people may:  · Shake uncontrollably.  · Breathe faster and deeper than normal (hyperventilate).  · Vomit.  · For people " with asthma, stress can trigger an attack.  · For some people, stress may trigger migraine headaches, ulcers, and body pain.  PHYSICAL EFFECTS OF STRESS MAY INCLUDE:  · Loss of energy.  · Skin problems.  · Aches and pains resulting from tense muscles, including neck ache, backache and tension headaches.  · Increased pain from arthritis and other conditions.  · Irregular heart beat (palpitations).  · Periods of irritability or anger.  · Apathy or depression.  · Anxiety (feeling uptight or worrying).  · Unusual behavior.  · Loss of appetite.  · Comfort eating.  · Lack of concentration.  · Loss of, or decreased, sex-drive.  · Increased smoking, drinking, or recreational drug use.  · For women, missed periods.  · Ulcers, joint pain, and muscle pain.  Post-traumatic stress is the stress caused by any serious accident, strong emotional damage, or extremely difficult or violent experience such as rape or war.  Post-traumatic stress victims can experience mixtures of emotions such as fear, shame, depression, guilt or anger. It may include recurrent memories or images that may be haunting. These feelings can last for weeks, months or even years after the traumatic event that triggered them. Specialized treatment, possibly with medicines and psychological therapies, is available.  If stress is causing physical symptoms, severe distress or making it difficult for you to function as normal, it is worth seeing your caregiver. It is important to remember that although stress is a usual part of life, extreme or prolonged stress can lead to other illnesses that will need treatment. It is better to visit a doctor sooner rather than later. Stress has been linked to the development of high blood pressure and heart disease, as well as insomnia and depression.  There is no diagnostic test for stress since everyone reacts to it differently. But a caregiver will be able to spot the physical symptoms, such  as:  · Headaches.  · Shingles.  · Ulcers.  Emotional distress such as intense worry, low mood or irritability should be detected when the doctor asks pertinent questions to identify any underlying problems that might be the cause. In case there are physical reasons for the symptoms, the doctor may also want to do some tests to exclude certain conditions.  If you feel that you are suffering from stress, try to identify the aspects of your life that are causing it. Sometimes you may not be able to change or avoid them, but even a small change can have a positive ripple effect. A simple lifestyle change can make all the difference.  STRATEGIES THAT CAN HELP DEAL WITH STRESS:  · Delegating or sharing responsibilities.  · Avoiding confrontations.  · Learning to be more assertive.  · Regular exercise.  · Avoid using alcohol or street drugs to cope.  · Eating a healthy, balanced diet, rich in fruit and vegetables and proteins.  · Finding humor or absurdity in stressful situations.  · Never taking on more than you know you can handle comfortably.  · Organizing your time better to get as much done as possible.  · Talking to friends or family and sharing your thoughts and fears.  · Listening to music or relaxation tapes.  · Tensing and then relaxing your muscles, starting at the toes and working up to the head and neck.  If you think that you would benefit from help, either in identifying the things that are causing your stress or in learning techniques to help you relax, see a caregiver who is capable of helping you with this. Rather than relying on medications, it is usually better to try and identify the things in your life that are causing stress and try to deal with them.  There are many techniques of managing stress including counseling, psychotherapy, aromatherapy, yoga, and exercise. Your caregiver can help you determine what is best for you.  Document Released: 03/09/2004 Document Revised: 03/11/2013 Document  Reviewed: 02/03/2009  [employer]® Patient Information ©2014 [employer].

## 2018-11-02 DIAGNOSIS — Z11.3 SCREENING FOR STDS (SEXUALLY TRANSMITTED DISEASES): ICD-10-CM

## 2018-11-02 LAB
C TRACH DNA SPEC QL NAA+PROBE: NEGATIVE
N GONORRHOEA DNA SPEC QL NAA+PROBE: NEGATIVE
SPECIMEN SOURCE: NORMAL

## 2018-11-06 NOTE — ASSESSMENT & PLAN NOTE
Patient recently found that her  is having an affair.  She would like to be checked for sexually transmitted diseases.  She has been using her Xanax more often secondary to the stress.  She denies any suicidal thoughts or plans.  She is doing things to keep herself healthy.  She is working on exercising more.

## 2018-11-23 ENCOUNTER — HOSPITAL ENCOUNTER (OUTPATIENT)
Dept: LAB | Facility: MEDICAL CENTER | Age: 43
End: 2018-11-23
Attending: FAMILY MEDICINE
Payer: OTHER GOVERNMENT

## 2018-11-23 DIAGNOSIS — Z11.3 SCREENING FOR STDS (SEXUALLY TRANSMITTED DISEASES): ICD-10-CM

## 2018-11-23 DIAGNOSIS — R73.9 HYPERGLYCEMIA: ICD-10-CM

## 2018-11-23 LAB
HCV AB SER QL: NEGATIVE
HIV 1+2 AB+HIV1 P24 AG SERPL QL IA: NON REACTIVE
TREPONEMA PALLIDUM IGG+IGM AB [PRESENCE] IN SERUM OR PLASMA BY IMMUNOASSAY: NON REACTIVE

## 2018-11-23 PROCEDURE — 36415 COLL VENOUS BLD VENIPUNCTURE: CPT

## 2018-11-23 PROCEDURE — 87389 HIV-1 AG W/HIV-1&-2 AB AG IA: CPT

## 2018-11-23 PROCEDURE — 86780 TREPONEMA PALLIDUM: CPT

## 2018-11-23 PROCEDURE — 86803 HEPATITIS C AB TEST: CPT

## 2018-11-23 PROCEDURE — 83036 HEMOGLOBIN GLYCOSYLATED A1C: CPT

## 2018-11-24 LAB
EST. AVERAGE GLUCOSE BLD GHB EST-MCNC: 123 MG/DL
HBA1C MFR BLD: 5.9 % (ref 0–5.6)

## 2018-12-14 ENCOUNTER — OFFICE VISIT (OUTPATIENT)
Dept: URGENT CARE | Facility: CLINIC | Age: 43
End: 2018-12-14
Payer: OTHER GOVERNMENT

## 2018-12-14 VITALS
HEIGHT: 59 IN | HEART RATE: 89 BPM | BODY MASS INDEX: 26.21 KG/M2 | SYSTOLIC BLOOD PRESSURE: 112 MMHG | OXYGEN SATURATION: 99 % | WEIGHT: 130 LBS | DIASTOLIC BLOOD PRESSURE: 72 MMHG | TEMPERATURE: 98.4 F | RESPIRATION RATE: 16 BRPM

## 2018-12-14 DIAGNOSIS — H10.33 ACUTE CONJUNCTIVITIS OF BOTH EYES, UNSPECIFIED ACUTE CONJUNCTIVITIS TYPE: Primary | ICD-10-CM

## 2018-12-14 PROCEDURE — 99214 OFFICE O/P EST MOD 30 MIN: CPT | Performed by: INTERNAL MEDICINE

## 2018-12-14 RX ORDER — POLYMYXIN B SULFATE AND TRIMETHOPRIM 1; 10000 MG/ML; [USP'U]/ML
1 SOLUTION OPHTHALMIC EVERY 4 HOURS
Qty: 10 ML | Refills: 0 | Status: SHIPPED | OUTPATIENT
Start: 2018-12-14 | End: 2018-12-21

## 2018-12-14 ASSESSMENT — ENCOUNTER SYMPTOMS
EYE PAIN: 0
CARDIOVASCULAR NEGATIVE: 1
FEVER: 0
EYE DISCHARGE: 1
PSYCHIATRIC NEGATIVE: 1
GASTROINTESTINAL NEGATIVE: 1
BLURRED VISION: 0
EYE REDNESS: 1
SENSORY CHANGE: 0
FOREIGN BODY SENSATION: 0
FOCAL WEAKNESS: 0
DIZZINESS: 0
CONSTITUTIONAL NEGATIVE: 1
EYE ITCHING: 0
MUSCULOSKELETAL NEGATIVE: 1
RESPIRATORY NEGATIVE: 1
WEAKNESS: 0
SHORTNESS OF BREATH: 0
NEUROLOGICAL NEGATIVE: 1

## 2018-12-14 NOTE — PROGRESS NOTES
Subjective:     Dasha Luu is a 43 y.o. female who presents for [place] [facility] (xtoday, woke up to both eyes red,little bit of discharge)       Eye Problem    Both eyes are affected.This is a new problem. The current episode started today. There was no injury mechanism. The patient is experiencing no pain. There is no known exposure to pink eye. She wears contacts. Associated symptoms include an eye discharge and eye redness. Pertinent negatives include no blurred vision, fever, foreign body sensation, itching, recent URI or weakness. Associated symptoms comments: +discharge in AM. She has tried nothing for the symptoms.   Pt states similar symptoms occurred 1 week ago but resolved and came back.    PMH:  has a past medical history of Anxiety; Hyperglycemia; and Thrombocytosis (HCC).    MEDS:   Current Outpatient Prescriptions:   •  polymixin-trimethoprim (POLYTRIM) 92144-6.1 UNIT/ML-% Solution, Place 1 Drop in both eyes every 4 hours for 7 days., Disp: 10 mL, Rfl: 0  •  aspirin (ASA) 81 MG Chew Tab chewable tablet, Take 81 mg by mouth every day., Disp: , Rfl:   •  Cholecalciferol (VITAMIN D PO), Take  by mouth., Disp: , Rfl:   •  ibuprofen (MOTRIN) 200 MG Tab, Take 200 mg by mouth every 6 hours as needed., Disp: , Rfl:     ALLERGIES:   Allergies   Allergen Reactions   • Penicillins Itching     redness       SURGHX:   Past Surgical History:   Procedure Laterality Date   • ABDOMINOPLASTY  6/15/2011    Performed by ESVIN BAILEY at [facility] ORS   • LIPOSUCTION  6/15/2011    Performed by ESVIN BAILEY at [facility]   • MAMMOPLASTY AUGMENTATION  6/15/2011    Performed by ESVIN BAILEY at [facility] ORS       SOCHX:  reports that she has quit smoking. Her smoking use included Cigarettes. She has a 4.00 pack-year smoking history. She has never used smokeless tobacco. She reports that she does not drink alcohol or use drugs.    FH: Reviewed with  "patient, not pertinent to this visit.     Review of Systems   Constitutional: Negative.  Negative for fever and malaise/fatigue.   HENT: Negative.    Eyes: Positive for discharge and redness. Negative for blurred vision, pain and itching.   Respiratory: Negative.  Negative for shortness of breath.    Cardiovascular: Negative.  Negative for chest pain.   Gastrointestinal: Negative.    Genitourinary: Negative.    Musculoskeletal: Negative.    Skin: Negative.    Neurological: Negative.  Negative for dizziness, sensory change, focal weakness and weakness.   Psychiatric/Behavioral: Negative.    All other systems reviewed and are negative.     Objective:     /72   Pulse 89   Temp 36.9 °C (98.4 °F) (Temporal)   Resp 16   Ht 1.499 m (4' 11\")   Wt 59 kg (130 lb)   SpO2 99%   BMI 26.26 kg/m²     Physical Exam   Constitutional: She is oriented to person, place, and time. She appears well-developed and well-nourished.   HENT:   Right Ear: External ear normal.   Left Ear: External ear normal.   Eyes: Pupils are equal, round, and reactive to light. EOM and lids are normal. Right eye exhibits no discharge. No foreign body present in the right eye. Left eye exhibits no discharge. No foreign body present in the left eye. Right conjunctiva is injected. Left conjunctiva is injected.   Fluorescin exam performed   Neck: Normal range of motion.   Cardiovascular: Normal rate, regular rhythm, normal heart sounds and intact distal pulses.    Pulmonary/Chest: Effort normal and breath sounds normal. No respiratory distress.   Abdominal: Bowel sounds are normal.   Musculoskeletal: Normal range of motion. She exhibits no deformity.   Neurological: She is alert and oriented to person, place, and time. She has normal strength. No sensory deficit.   Skin: Skin is warm and dry. Capillary refill takes less than 2 seconds.   Psychiatric: She has a normal mood and affect.   Vitals reviewed.       Assessment/Plan:     1. Acute " conjunctivitis of both eyes, unspecified acute conjunctivitis type  - polymixin-trimethoprim (POLYTRIM) 91108-3.1 UNIT/ML-% Solution; Place 1 Drop in both eyes every 4 hours for 7 days.  Dispense: 10 mL; Refill: 0    Pt instructed to avoid contact lenses until condition resolves. Discussed OTC eye washes and artificial tears.    Otherwise, patient advised to: Return for 1) Symptoms that change or worsen, or go to the ER, 2) Follow up with primary care.    Differential diagnosis, natural history, supportive care, and indications for immediate follow-up discussed. All questions answered. Patient agrees with the plan of care.    Case and results reviewed and agree with treatment plan as outlined.  Dr. Copeland

## 2018-12-14 NOTE — PATIENT INSTRUCTIONS
"Conjunctivitis  Conjunctivitis is commonly called \"pink eye.\" Conjunctivitis can be caused by bacterial or viral infection, allergies, or injuries. There is usually redness of the lining of the eye, itching, discomfort, and sometimes discharge. There may be deposits of matter along the eyelids. A viral infection usually causes a watery discharge, while a bacterial infection causes a yellowish, thick discharge. Pink eye is very contagious and spreads by direct contact.  You may be given antibiotic eyedrops as part of your treatment. Before using your eye medicine, remove all drainage from the eye by washing gently with warm water and cotton balls. Continue to use the medication until you have awakened 2 mornings in a row without discharge from the eye. Do not rub your eye. This increases the irritation and helps spread infection. Use separate towels from other household members. Wash your hands with soap and water before and after touching your eyes. Use cold compresses to reduce pain and sunglasses to relieve irritation from light. Do not wear contact lenses or wear eye makeup until the infection is gone.  SEEK MEDICAL CARE IF:   · Your symptoms are not better after 3 days of treatment.  · You have increased pain or trouble seeing.  · The outer eyelids become very red or swollen.  Document Released: 01/25/2006 Document Revised: 03/11/2013 Document Reviewed: 12/18/2006  [employer]® Patient Information ©2014 [employer].    "

## 2019-01-03 ENCOUNTER — OFFICE VISIT (OUTPATIENT)
Dept: MEDICAL GROUP | Facility: LAB | Age: 44
End: 2019-01-03
Payer: OTHER GOVERNMENT

## 2019-01-03 VITALS
HEART RATE: 65 BPM | WEIGHT: 131 LBS | BODY MASS INDEX: 26.41 KG/M2 | SYSTOLIC BLOOD PRESSURE: 122 MMHG | RESPIRATION RATE: 20 BRPM | DIASTOLIC BLOOD PRESSURE: 68 MMHG | TEMPERATURE: 97.8 F | OXYGEN SATURATION: 100 % | HEIGHT: 59 IN

## 2019-01-03 DIAGNOSIS — F41.1 GAD (GENERALIZED ANXIETY DISORDER): ICD-10-CM

## 2019-01-03 DIAGNOSIS — H10.33 ACUTE BACTERIAL CONJUNCTIVITIS OF BOTH EYES: ICD-10-CM

## 2019-01-03 DIAGNOSIS — Z30.011 FAMILY PLANNING, BCP (BIRTH CONTROL PILLS) INITIAL PRESCRIPTION: ICD-10-CM

## 2019-01-03 PROCEDURE — 99214 OFFICE O/P EST MOD 30 MIN: CPT | Performed by: FAMILY MEDICINE

## 2019-01-03 RX ORDER — ALPRAZOLAM 0.25 MG/1
TABLET ORAL
COMMUNITY
Start: 2018-11-12 | End: 2019-01-03 | Stop reason: SDUPTHER

## 2019-01-03 RX ORDER — NORETHINDRONE ACETATE AND ETHINYL ESTRADIOL 1MG-20(21)
1 KIT ORAL DAILY
Qty: 28 TAB | Refills: 6 | Status: SHIPPED | OUTPATIENT
Start: 2019-01-03

## 2019-01-03 RX ORDER — ALPRAZOLAM 0.25 MG/1
0.25 TABLET ORAL NIGHTLY PRN
Qty: 30 TAB | Refills: 0 | Status: SHIPPED | OUTPATIENT
Start: 2019-01-03 | End: 2019-02-02

## 2019-01-03 ASSESSMENT — PATIENT HEALTH QUESTIONNAIRE - PHQ9: CLINICAL INTERPRETATION OF PHQ2 SCORE: 0

## 2019-01-03 NOTE — LETTER
January 3, 2019         Patient: Dasha Luu   YOB: 1975   Date of Visit: 1/3/2019           To Whom it May Concern:    Dasha Luu was seen in my clinic on 1/3/2019. She is in good health and able to work .    If you have any questions or concerns, please don't hesitate to call.        Sincerely,           Anni Munoz M.D.  Electronically Signed

## 2019-01-04 NOTE — PATIENT INSTRUCTIONS
Intrauterine Device Information  Introduction  An intrauterine device (IUD) is a medical device that gets inserted into the uterus to prevent pregnancy. It is a small, T-shaped device that has one or two nylon strings hanging down from it. The strings hang out of the lower part of the uterus (cervix) to allow for future IUD removal. There are two types of IUDs available:  · Copper IUD. This type of IUD has copper wire wrapped around it. A copper IUD may last up to 10 years.  · Hormone IUD. This type of IUD is made of plastic and contains the hormone progestin (synthetic progesterone). A hormone IUD may last 3 to 5 years.  IUDs are inserted through the vagina and placed into the uterus with a minor medical procedure.  How does the IUD work?  Copper in the copper IUD prevents pregnancy by making the uterus and fallopian tubes produce a fluid that kills sperm.  Synthetic progesterone in hormonal IUD prevents pregnancy by:  · Thickening cervical mucus to prevent sperm from entering the uterus.  · Thinning the uterine lining to prevent implantation of a fertilized egg.  · Weakening or killing sperm that get into the uterus.  What are the advantages of an IUD?  · IUDs are highly effective, reversible, long-acting, and low-maintenance.  · There are no estrogen-related side effects.  · An IUD can be used when breastfeeding.  · IUDs are not associated with weight gain.  · Advantages of the copper IUD are that:  ¨ It works immediately after insertion.  ¨ It does not interfere with your body's natural hormones.  ¨ It can be used for 10 years.  · Advantages of the hormone IUD are that:  ¨ If it is inserted within 7 days of your period starting, it works immediately after insertion. If the hormone IUD is inserted at any other time in your cycle, you will need to use a backup method of birth control for 7 days after insertion.  ¨ It can make menstrual periods lighter.  ¨ It can decrease menstrual cramping.  ¨ It can be used for 3  or 5 years.  What are the disadvantages of an IUD?  · The hormone IUD may cause irregular menstrual bleeding for a period of time after insertion.  · The copper IUD can make your menstrual flow heavier and more painful.  · You may experience some pain during insertion, and cramping and vaginal bleeding after insertion.  How is the IUD removed? Is the IUD right for me?   Your health care provider will make sure you are a good candidate for an IUD and will discuss side effects with you.  This information is not intended to replace advice given to you by your health care provider. Make sure you discuss any questions you have with your health care provider.  Document Released: 11/21/2005 Document Revised: 05/25/2017 Document Reviewed: 06/08/2014  © 2017 [employer]

## 2019-01-15 NOTE — PROGRESS NOTES
Subjective:     Chief Complaint   Patient presents with   • Contraception   • Eye Problem     seen in UC, no issue with infx, just having irritation in eyes   • Letter for School/Work       Dasha Luu is a 43 y.o. female here today for evaluation and management of:    Acute bacterial conjunctivitis of both eyes  Patient has had 7 days of bilateral although worse in the right, erythematous eyes with yellowish discharge. Positive for itchiness. No loss or change in vision. No eye pain. No pain with movement. Discussed warm compresses and topical antibiotic.  Patient reports when she took her contacts out and took off her artificial lashes the symptoms improved but then she put them back in.  She broke her glasses.        Family planning, BCP (birth control pills) initial prescription  Patient would like to go back on oral contraceptives but she is worried about gaining weight.  She denies any history of migraine with aura or blood clots.    TANVIR (generalized anxiety disorder)  Patient has been using her alprazolam more often since her divorce.  She denies any suicidal thoughts or plans.       Allergies   Allergen Reactions   • Penicillins Itching     redness       Current medicines (including changes today)  Current Outpatient Prescriptions   Medication Sig Dispense Refill   • ALPRAZolam (XANAX) 0.25 MG Tab Take 1 Tab by mouth at bedtime as needed for Sleep for up to 30 days. 30 Tab 0   • norethindrone-ethinyl estradiol (LOESTRIN FE 1/20) 1-20 MG-MCG per tablet Take 1 Tab by mouth every day. 28 Tab 6   • aspirin (ASA) 81 MG Chew Tab chewable tablet Take 81 mg by mouth every day.     • Cholecalciferol (VITAMIN D PO) Take  by mouth.     • ibuprofen (MOTRIN) 200 MG Tab Take 200 mg by mouth every 6 hours as needed.       No current facility-administered medications for this visit.        She  has a past medical history of Anxiety; Hyperglycemia; and Thrombocytosis (HCC).    Patient Active Problem  "List    Diagnosis Date Noted   • Acute bacterial conjunctivitis of both eyes 01/03/2019   • Family planning, BCP (birth control pills) initial prescription 01/03/2019   • Breast pain 09/07/2018   • Screening for STDs (sexually transmitted diseases) 07/18/2017   • Dizziness 06/16/2017   • SOB (shortness of breath) 06/16/2017   • Heart palpitations 06/16/2017   • LLQ abdominal pain 06/16/2017   • Screening for cervical cancer 12/02/2016   • Dyslipidemia 12/02/2016   • Vitamin D deficiency 12/02/2016   • History of tobacco abuse 06/17/2016   • Thyromegaly 06/17/2016   • Hyperglycemia    • Thrombocytosis (HCC)    • External hemorrhoids 06/20/2014   • TANVIR (generalized anxiety disorder) 06/20/2014       ROS   No fever or chills.  No nausea or vomiting.  No chest pain or palpitations.  No cough or SOB.  No pain with urination or hematuria.  No black or bloody stools.       Objective:     Blood pressure 122/68, pulse 65, temperature 36.6 °C (97.8 °F), temperature source Temporal, resp. rate 20, height 1.499 m (4' 11\"), weight 59.4 kg (131 lb), SpO2 100 %, not currently breastfeeding. Body mass index is 26.46 kg/m².   Physical Exam:  Well developed, well nourished.  Alert, oriented in no acute distress.  Eye contact is good, speech goal directed, affect calm  Eyes: conjunctiva injected right greater than left with some yellow discharge on her lashes, sclera non-icteric.  Ears: Pinna normal. TM pearly gray.   Nose: Nares are patent.  Normal mucosa  Mouth: Oral mucous membranes pink and moist with no lesions.  Neck Supple.  No adenopathy or masses in the neck or supraclavicular regions. No thyromegaly  Lungs: clear to auscultation bilaterally with good excursion. No wheezes or rhonchi  CV: regular rate and rhythm. No murmur       Assessment and Plan:   The following treatment plan was discussed    1. Acute bacterial conjunctivitis of both eyes  Take contacts out and lashes off for 7 days.  Use drops as prescribed.  Hygiene " discussed    2. Family planning, BCP (birth control pills) initial prescription  Trial of Loestrin.  Risks and benefits discussed   - norethindrone-ethinyl estradiol (LOESTRIN FE 1/20) 1-20 MG-MCG per tablet; Take 1 Tab by mouth every day.  Dispense: 28 Tab; Refill: 6    3. TANVIR (generalized anxiety disorder)  Refill alprazolam for prn use.  Controlled substance agreement on chart.  [place]  reviewed.  Discussed risk of addiction as potential increased risk of Alzheimer's.  - ALPRAZolam (XANAX) 0.25 MG Tab; Take 1 Tab by mouth at bedtime as needed for Sleep for up to 30 days.  Dispense: 30 Tab; Refill: 0    Any change or worsening of signs or symptoms, patient encouraged to follow-up or report to the emergency room for further evaluation. Patient understands and agrees.    Followup: Return in about 3 months (around 4/3/2019).

## 2019-01-15 NOTE — ASSESSMENT & PLAN NOTE
Patient has been using her alprazolam more often since her divorce.  She denies any suicidal thoughts or plans.  
Patient has had 7 days of bilateral although worse in the right, erythematous eyes with yellowish discharge. Positive for itchiness. No loss or change in vision. No eye pain. No pain with movement. Discussed warm compresses and topical antibiotic.  Patient reports when she took her contacts out and took off her artificial lashes the symptoms improved but then she put them back in.  She broke her glasses.      
Patient would like to go back on oral contraceptives but she is worried about gaining weight.  She denies any history of migraine with aura or blood clots.  
no

## 2023-12-12 NOTE — ASSESSMENT & PLAN NOTE
Epidural catheter Procedure Note    Pre-Procedure   Staff -        Anesthesiologist:  Catrachita Burciaga MD       Performed By: anesthesiologist       Location: OB       Procedure Start/Stop Times: 12/12/2023 12:45 AM and 12/12/2023 1:02 AM       Pre-Anesthestic Checklist: patient identified, IV checked, risks and benefits discussed, informed consent, monitors and equipment checked, pre-op evaluation, at physician/surgeon's request and post-op pain management  Timeout:       Correct Patient: Yes        Correct Procedure: Yes        Correct Site: Yes        Correct Position: Yes   Procedure Documentation  Procedure: epidural catheter       Diagnosis: Labor       Patient Position: sitting       Patient Prep/Sterile Barriers: sterile gloves, mask, patient draped       Skin prep: Chloraprep       Local skin infiltrated with mL of 1% lidocaine.        Insertion Site: L3-4. (midline approach).       Technique: LORT saline and LORT air        JOSE CRUZ at 4 cm.       Needle Type: MAPPER Lithographyy needle       Needle Gauge: 18.        Needle Length (Inches): 3.5        Catheter: 20 G.          Catheter threaded easily.         6 cm epidural space.         Threaded 10 cm at skin.         # of attempts: 1 and  # of redirects:  1    Assessment/Narrative         Paresthesias: No.       Test dose of 3 mL lidocaine 1.5% w/ 1:200,000 epinephrine at 00:54 CST.         Test dose negative, 3 minutes after injection, for signs of intravascular, subdural, or intrathecal injection.       Insertion/Infusion Method: LORT saline and LORT air       Aspiration negative for Heme or CSF via Epidural Catheter.       Sensory Level Left: T10.       Sensory Level Right: T10.    Medication(s) Administered   0.25% Bupivacaine PF (Epidural) - EPIDURAL   5 mL - 12/12/2023 1:00:00 AM  Medication Administration Time: 12/12/2023 12:45 AM     Comments:  Epi   R/b/a discussed, patient agrees to have an epidural catheter placement.   Local infiltration of skin,  Stable. Currently taking Xanax 0.25 mg occasionally as prescribed.  She would like a refill  Denies side effects and is tolerating well.  Mood is improved with current medication and therapy.    Patient denies SI/HI.         "advancement of needle without paresthesias, excellent Dodie to NS.   Catheter advanced without resistance nor paresthesias, negative aspiration for heme or CSF.  Patient tolerated the procedure well, all questions answered.        FOR [facility] ([facility]) ONLY:   Pain Team Contact information: please page the Pain Team Via [employer]. Search \"Pain\". During daytime hours, please page the attending first. At night please page the resident first.      "

## 2025-06-26 ENCOUNTER — OFFICE VISIT (OUTPATIENT)
Dept: MEDICAL GROUP | Facility: LAB | Age: 50
End: 2025-06-26

## 2025-06-26 VITALS
DIASTOLIC BLOOD PRESSURE: 62 MMHG | WEIGHT: 125.2 LBS | HEIGHT: 60 IN | HEART RATE: 76 BPM | TEMPERATURE: 98.6 F | OXYGEN SATURATION: 96 % | SYSTOLIC BLOOD PRESSURE: 104 MMHG | RESPIRATION RATE: 14 BRPM | BODY MASS INDEX: 24.58 KG/M2

## 2025-06-26 DIAGNOSIS — E55.9 VITAMIN D DEFICIENCY: ICD-10-CM

## 2025-06-26 DIAGNOSIS — R73.03 PREDIABETES: ICD-10-CM

## 2025-06-26 DIAGNOSIS — E78.5 DYSLIPIDEMIA: ICD-10-CM

## 2025-06-26 DIAGNOSIS — F17.200 SMOKER: ICD-10-CM

## 2025-06-26 DIAGNOSIS — N95.1 PERIMENOPAUSE: ICD-10-CM

## 2025-06-26 DIAGNOSIS — Z00.00 PREVENTATIVE HEALTH CARE: ICD-10-CM

## 2025-06-26 DIAGNOSIS — Z12.31 ENCOUNTER FOR SCREENING MAMMOGRAM FOR BREAST CANCER: ICD-10-CM

## 2025-06-26 DIAGNOSIS — Z12.4 SCREENING FOR CERVICAL CANCER: ICD-10-CM

## 2025-06-26 DIAGNOSIS — Z12.12 SCREENING FOR COLORECTAL CANCER: ICD-10-CM

## 2025-06-26 DIAGNOSIS — Z12.11 SCREENING FOR COLORECTAL CANCER: ICD-10-CM

## 2025-06-26 DIAGNOSIS — F33.8 SEASONAL DEPRESSION (HCC): ICD-10-CM

## 2025-06-26 DIAGNOSIS — F41.1 GENERALIZED ANXIETY DISORDER: ICD-10-CM

## 2025-06-26 DIAGNOSIS — D75.839 THROMBOCYTOSIS: ICD-10-CM

## 2025-06-26 PROBLEM — R00.2 PALPITATIONS: Status: RESOLVED | Noted: 2017-06-16 | Resolved: 2025-06-26

## 2025-06-26 PROBLEM — R10.32 LLQ ABDOMINAL PAIN: Status: RESOLVED | Noted: 2017-06-16 | Resolved: 2025-06-26

## 2025-06-26 PROBLEM — H10.33 ACUTE BACTERIAL CONJUNCTIVITIS OF BOTH EYES: Status: RESOLVED | Noted: 2019-01-03 | Resolved: 2025-06-26

## 2025-06-26 PROBLEM — R06.02 SHORTNESS OF BREATH: Status: RESOLVED | Noted: 2017-06-16 | Resolved: 2025-06-26

## 2025-06-26 PROBLEM — R42 DIZZINESS: Status: RESOLVED | Noted: 2017-06-16 | Resolved: 2025-06-26

## 2025-06-26 PROBLEM — N64.4 BREAST PAIN: Status: RESOLVED | Noted: 2018-09-07 | Resolved: 2025-06-26

## 2025-06-26 PROCEDURE — 3074F SYST BP LT 130 MM HG: CPT | Performed by: NURSE PRACTITIONER

## 2025-06-26 PROCEDURE — 3078F DIAST BP <80 MM HG: CPT | Performed by: NURSE PRACTITIONER

## 2025-06-26 PROCEDURE — 99204 OFFICE O/P NEW MOD 45 MIN: CPT | Performed by: NURSE PRACTITIONER

## 2025-06-26 RX ORDER — BUPROPION HYDROCHLORIDE 150 MG/1
150 TABLET ORAL EVERY MORNING
Qty: 30 TABLET | Refills: 6 | Status: SHIPPED | OUTPATIENT
Start: 2025-06-26

## 2025-06-26 ASSESSMENT — PATIENT HEALTH QUESTIONNAIRE - PHQ9: CLINICAL INTERPRETATION OF PHQ2 SCORE: 0

## 2025-06-26 NOTE — PROGRESS NOTES
Chief Complaint   Patient presents with    Establish Care    Other     Hormones / menopause     Requesting Labs    Muscle Ache     Verbal consent was acquired by the patient to use [facility] ambient listening note generation during this visit Yes      HPI:  History of Present Illness  The patient presents to establish care and for evaluation of perimenopause, depression, and smoking cessation.    She reports experiencing weight gain, joint pain, and irregular menstrual cycles over the past year. Approximately a year ago, she missed two menstrual cycles after completing a 75-day challenge, which she attributes to overexertion. Her menstrual cycle resumed regularity after resuming carbohydrates and sugar, but has since been inconsistent. She is uncertain if her joint pain is due to her lifting or menopause. Despite her efforts to maintain a regular exercise routine of five days a week, she finds it challenging to manage her weight. She expresses concern about potential vitamin deficiencies, particularly vitamin D, and is hesitant to start hormone replacement therapy. She has been taking magnesium supplements at night to aid with bowel movements.     She reports experiencing hot flashes and night sweats. She has breast implants and has not had a Pap smear in seven years. She is not interested in getting a colonoscopy at this time. She is not interested in getting hepatitis or pneumonia vaccines at this time. She takes vitamin C, magnesium, Retin-A, BCAAs, amino acids, glucosamine, and an unspecified supplement for joint and muscle pain, although she admits to inconsistent use of the latter. She has a family history of thyroid issues in her grandmother. She underwent an ultrasound and x-ray for a suspected enlarged thyroid in the past, both of which were normal.    She experiences seasonal depression twice a year and was previously prescribed Xanax as needed. She lost her insurance coverage years ago and is now  "self-pay, making it difficult for her to renew her medication monthly. She recalls an episode of depression about a month ago, which led to overeating.    She took Chantix in the past but experienced anxiety attacks six months later and resumed smoking two years ago. She is considering restarting Chantix but is concerned about potential side effects.    GYNECOLOGICAL HISTORY:  - Frequency and Flow: Irregular; sometimes missed for 2 months    SOCIAL HISTORY  The patient started smoking again 2 years ago.    FAMILY HISTORY  The patient's grandmother had thyroid issues.  The patient's mother had a hysterectomy at 35, so she does not know when she went through menopause.    ROS:  As documented in history of present illness above    Exam:   /62 (BP Location: Right arm, Patient Position: Sitting, BP Cuff Size: Adult)   Pulse 76   Temp 37 °C (98.6 °F)   Resp 14   Ht 1.511 m (4' 11.5\")   Wt 56.8 kg (125 lb 3.2 oz)   SpO2 96%   BMI 24.86 kg/m²     Constitutional: Alert, no distress, well-groomed.  Skin: Warm, dry, good turgor, no rashes in visible areas.  Eye: Equal, round and reactive, conjunctiva clear, lids normal.  ENMT: Lips without lesions, good dentition, moist mucous membranes.  Neck: Trachea midline, no masses, no thyromegaly.  Respiratory: Unlabored respiratory effort, no cough.  MSK: Normal gait, moves all extremities.  Neuro: Grossly non-focal.   Psych: Alert and oriented x3, normal affect and mood.    Assessment / Plan:  Assessment & Plan  Perimenopause.  Symptoms include weight gain, irregular periods, achy joints, and sleeplessness. A comprehensive blood panel has been ordered to assess red and white blood cells, electrolytes, kidney and liver function, cholesterol, blood sugar, thyroid (TSH and free T4), vitamin D, B12, B6, folate, and iron levels. A mammogram will be scheduled, and a referral to gynecology for a Pap smear will be made. The patient is advised to continue taking magnesium " supplements. Black cohosh may be considered if hot flashes or night sweats occur.    Depression and anxiety.  The patient reports seasonal depression and has previously used Xanax for anxiety. Wellbutrin 150 mg once daily has been prescribed. The patient has been informed about the potential side effects of Wellbutrin. If Wellbutrin is well-tolerated and she wishes to continue, she can request a 90-day supply.    Smoking cessation.  The patient expressed a desire to quit smoking. Wellbutrin will be used as part of her smoking cessation plan. She is advised to pick a quit date within the first 1 to 2 weeks of starting Wellbutrin.    Orders placed:  1. Perimenopause    2. Seasonal depression (HCC)  - buPROPion (WELLBUTRIN XL) 150 MG XL tablet; Take 1 Tablet by mouth every morning.  Dispense: 30 Tablet; Refill: 6    3. TANVIR (generalized anxiety disorder)  - buPROPion (WELLBUTRIN XL) 150 MG XL tablet; Take 1 Tablet by mouth every morning.  Dispense: 30 Tablet; Refill: 6    4. Smoker    5. Thrombocytosis    6. Vitamin D deficiency    7. Prediabetes    8. Dyslipidemia    9. Encounter for screening mammogram for breast cancer  - MA-SCREENING MAMMO BILAT W/ IMPLANTS W/CAD; Future    10. Screening for cervical cancer  - Referral to Gynecology    11. Screening for colorectal cancer  - Cologuard® colon cancer screening    12. Preventative health care  - CBC WITHOUT DIFFERENTIAL; Future  - Comp Metabolic Panel; Future  - Lipid Profile; Future  - HEMOGLOBIN A1C; Future  - TSH WITH REFLEX TO FT4; Future  - VITAMIN D,25 HYDROXY (DEFICIENCY); Future  - VITAMIN B12; Future  - VITAMIN B6; Future  - FOLATE; Future  - IRON/TOTAL IRON BIND; Future  - MAGNESIUM    Please note that this dictation was created using voice recognition software. I have made every reasonable attempt to correct obvious errors, but I expect that there are errors of grammar and possibly content that I did not discover before finalizing the note.

## 2025-07-03 NOTE — Clinical Note
REFERRAL APPROVAL NOTICE         Sent on July 3, 2025                   Luci Luu  [address] [place] [address]                   Dear Ms. Razomarcianokirill,    After a careful review of the medical information and benefit coverage, [facility] has processed your referral. See below for additional details.    If applicable, you must be actively enrolled with your insurance for coverage of the authorized service. If you have any questions regarding your coverage, please contact your insurance directly.    REFERRAL INFORMATION   Referral #:  [id number]  Referred-To Department    Referred-By Provider:  Gynecology    ALLEN Meredith   [facility]      [address]  [place] [id number] [phone] [address]  [place] [id number] [phone]    Referral Start Date:  06/26/2025  Referral End Date:   06/26/2026             SCHEDULING  If you do not already have an appointment, please call [phone] to make an appointment.     MORE INFORMATION  If you do not already have a [employer] account, sign up at: [email or web address]  You can access your medical information, make appointments, see lab results, billing information, and more.  If you have questions regarding this referral, please contact  the [facility] Referrals department at:             [phone]. Monday - Friday 8:00AM - 5:00PM.     Sincerely,    [facility]